# Patient Record
Sex: FEMALE | Race: OTHER | HISPANIC OR LATINO | URBAN - METROPOLITAN AREA
[De-identification: names, ages, dates, MRNs, and addresses within clinical notes are randomized per-mention and may not be internally consistent; named-entity substitution may affect disease eponyms.]

---

## 2023-06-16 ENCOUNTER — HOSPITAL ENCOUNTER (INPATIENT)
Facility: HOSPITAL | Age: 63
LOS: 4 days | Discharge: HOME | DRG: 418 | End: 2023-06-20
Attending: EMERGENCY MEDICINE | Admitting: INTERNAL MEDICINE

## 2023-06-16 DIAGNOSIS — E11.65 HYPERGLYCEMIA DUE TO DIABETES MELLITUS: Primary | ICD-10-CM

## 2023-06-16 DIAGNOSIS — K85.90 ACUTE PANCREATITIS, UNSPECIFIED COMPLICATION STATUS, UNSPECIFIED PANCREATITIS TYPE: ICD-10-CM

## 2023-06-16 DIAGNOSIS — R07.9 CHEST PAIN: ICD-10-CM

## 2023-06-16 LAB
ALBUMIN SERPL-MCNC: 4.1 G/DL (ref 3.5–5)
ALLENS TEST BLOOD GAS (OHS): ABNORMAL
ALP SERPL-CCNC: 224 UNIT/L (ref 40–150)
ALT SERPL-CCNC: 145 UNIT/L (ref 0–55)
ANION GAP SERPL CALC-SCNC: 13 MEQ/L
APPEARANCE UR: CLEAR
AST SERPL-CCNC: 152 UNIT/L (ref 5–34)
B-OH-BUTYR SERPL-MCNC: 0.13 MMOL/L
BACTERIA #/AREA URNS AUTO: ABNORMAL /HPF
BASE EXCESS BLD CALC-SCNC: 4 MMOL/L
BASOPHILS # BLD AUTO: 0.01 X10(3)/MCL
BASOPHILS NFR BLD AUTO: 0.2 %
BILIRUB UR QL STRIP.AUTO: NEGATIVE MG/DL
BILIRUBIN DIRECT+TOT PNL SERPL-MCNC: 0.4 MG/DL (ref 0–0.8)
BILIRUBIN DIRECT+TOT PNL SERPL-MCNC: 0.4 MG/DL (ref 0–?)
BILIRUBIN DIRECT+TOT PNL SERPL-MCNC: 0.8 MG/DL
BLOOD GAS SAMPLE TYPE (OHS): ABNORMAL
BNP BLD-MCNC: 13 PG/ML
BUN SERPL-MCNC: 13.7 MG/DL (ref 9.8–20.1)
CALCIUM SERPL-MCNC: 9.9 MG/DL (ref 8.4–10.2)
CHLORIDE SERPL-SCNC: 100 MMOL/L (ref 98–107)
CHOLEST SERPL-MCNC: 197 MG/DL
CHOLEST/HDLC SERPL: 4 {RATIO} (ref 0–5)
CO2 BLDA-SCNC: 31.2 MMOL/L
CO2 SERPL-SCNC: 24 MMOL/L (ref 22–29)
COHGB MFR BLDA: 2.8 %
COLOR UR: COLORLESS
CREAT SERPL-MCNC: 0.9 MG/DL (ref 0.55–1.02)
CREAT/UREA NIT SERPL: 15
D DIMER PPP IA.FEU-MCNC: 0.48 UG/ML FEU (ref 0–0.5)
EOSINOPHIL # BLD AUTO: 0.09 X10(3)/MCL (ref 0–0.9)
EOSINOPHIL NFR BLD AUTO: 1.4 %
ERYTHROCYTE [DISTWIDTH] IN BLOOD BY AUTOMATED COUNT: 12.6 % (ref 11.5–17)
EST. AVERAGE GLUCOSE BLD GHB EST-MCNC: 286.2 MG/DL
ETHANOL SERPL-MCNC: <10 MG/DL
FERRITIN SERPL-MCNC: 578.85 NG/ML (ref 4.63–204)
FIO2 BLOOD GAS (OHS): 21 %
GFR SERPLBLD CREATININE-BSD FMLA CKD-EPI: >60 MLS/MIN/1.73/M2
GLUCOSE SERPL-MCNC: 202 MG/DL (ref 70–110)
GLUCOSE SERPL-MCNC: 348 MG/DL (ref 74–100)
GLUCOSE UR QL STRIP.AUTO: ABNORMAL MG/DL
HAV IGM SERPL QL IA: NONREACTIVE
HBA1C MFR BLD: 11.6 %
HBV CORE IGM SERPL QL IA: NONREACTIVE
HBV SURFACE AG SERPL QL IA: NONREACTIVE
HCO3 BLDA-SCNC: 29.7 MMOL/L
HCT VFR BLD AUTO: 38.8 % (ref 37–47)
HCV AB SERPL QL IA: NONREACTIVE
HDLC SERPL-MCNC: 50 MG/DL (ref 35–60)
HGB BLD-MCNC: 13.2 G/DL (ref 12–16)
HIV 1+2 AB+HIV1 P24 AG SERPL QL IA: NONREACTIVE
HYALINE CASTS #/AREA URNS LPF: ABNORMAL /LPF
IMM GRANULOCYTES # BLD AUTO: 0.05 X10(3)/MCL (ref 0–0.04)
IMM GRANULOCYTES NFR BLD AUTO: 0.8 %
IRON SATN MFR SERPL: 36 % (ref 20–50)
IRON SERPL-MCNC: 80 UG/DL (ref 50–170)
KETONES UR QL STRIP.AUTO: NEGATIVE MG/DL
LDLC SERPL CALC-MCNC: 102 MG/DL (ref 50–140)
LEUKOCYTE ESTERASE UR QL STRIP.AUTO: 25 UNIT/L
LIPASE SERPL-CCNC: >3000 U/L
LYMPHOCYTES # BLD AUTO: 2.43 X10(3)/MCL (ref 0.6–4.6)
LYMPHOCYTES NFR BLD AUTO: 37.3 %
MAGNESIUM SERPL-MCNC: 1.6 MG/DL (ref 1.6–2.6)
MCH RBC QN AUTO: 29.8 PG (ref 27–31)
MCHC RBC AUTO-ENTMCNC: 34 G/DL (ref 33–36)
MCV RBC AUTO: 87.6 FL (ref 80–94)
METHGB MFR BLDA: 0.8 %
MONOCYTES # BLD AUTO: 0.4 X10(3)/MCL (ref 0.1–1.3)
MONOCYTES NFR BLD AUTO: 6.1 %
NEUTROPHILS # BLD AUTO: 3.54 X10(3)/MCL (ref 2.1–9.2)
NEUTROPHILS NFR BLD AUTO: 54.2 %
NITRITE UR QL STRIP.AUTO: NEGATIVE
NRBC BLD AUTO-RTO: 0 %
O2 HB BLOOD GAS (OHS): 82.6 %
OXYGEN DEVICE BLOOD GAS (OHS): ABNORMAL
OXYHGB MFR BLDA: 12.5 G/DL
PCO2 BLDA: 48 MMHG (ref 40–50)
PH BLDA: 7.4 [PH] (ref 7.3–7.4)
PH UR STRIP.AUTO: 6.5 [PH]
PHOSPHATE SERPL-MCNC: 3.1 MG/DL (ref 2.3–4.7)
PLATELET # BLD AUTO: 204 X10(3)/MCL (ref 130–400)
PMV BLD AUTO: 10.1 FL (ref 7.4–10.4)
PO2 BLDA: 51 MMHG (ref 30–40)
POCT GLUCOSE: 185 MG/DL (ref 70–110)
POCT GLUCOSE: 202 MG/DL (ref 70–110)
POCT GLUCOSE: 210 MG/DL (ref 70–110)
POCT GLUCOSE: 258 MG/DL (ref 70–110)
POCT GLUCOSE: 326 MG/DL (ref 70–110)
POTASSIUM SERPL-SCNC: 3.2 MMOL/L (ref 3.5–5.1)
PROT SERPL-MCNC: 7.4 GM/DL (ref 6.4–8.3)
PROT UR QL STRIP.AUTO: NEGATIVE MG/DL
RBC # BLD AUTO: 4.43 X10(6)/MCL (ref 4.2–5.4)
RBC #/AREA URNS AUTO: ABNORMAL /HPF
RBC UR QL AUTO: NEGATIVE UNIT/L
SAMPLE SITE BLOOD GAS (OHS): ABNORMAL
SAO2 % BLDA: 85.8 %
SODIUM SERPL-SCNC: 137 MMOL/L (ref 136–145)
SP GR UR STRIP.AUTO: 1
SQUAMOUS #/AREA URNS LPF: ABNORMAL /HPF
T PALLIDUM AB SER QL: REACTIVE
TIBC SERPL-MCNC: 142 UG/DL (ref 70–310)
TIBC SERPL-MCNC: 222 UG/DL (ref 250–450)
TRANSFERRIN SERPL-MCNC: 187 MG/DL (ref 180–382)
TRIGL SERPL-MCNC: 223 MG/DL (ref 37–140)
TROPONIN I SERPL-MCNC: 0 NG/ML (ref 0–0.04)
TROPONIN I SERPL-MCNC: <0.01 NG/ML (ref 0–0.04)
TSH SERPL-ACNC: 4.66 UIU/ML (ref 0.35–4.94)
UROBILINOGEN UR STRIP-ACNC: NORMAL MG/DL
VLDLC SERPL CALC-MCNC: 45 MG/DL
WBC # SPEC AUTO: 6.52 X10(3)/MCL (ref 4.5–11.5)
WBC #/AREA URNS AUTO: ABNORMAL /HPF

## 2023-06-16 PROCEDURE — 80061 LIPID PANEL: CPT

## 2023-06-16 PROCEDURE — G0378 HOSPITAL OBSERVATION PER HR: HCPCS

## 2023-06-16 PROCEDURE — 93005 ELECTROCARDIOGRAM TRACING: CPT

## 2023-06-16 PROCEDURE — 96372 THER/PROPH/DIAG INJ SC/IM: CPT | Mod: 59 | Performed by: EMERGENCY MEDICINE

## 2023-06-16 PROCEDURE — 86780 TREPONEMA PALLIDUM: CPT

## 2023-06-16 PROCEDURE — 63600175 PHARM REV CODE 636 W HCPCS: Performed by: STUDENT IN AN ORGANIZED HEALTH CARE EDUCATION/TRAINING PROGRAM

## 2023-06-16 PROCEDURE — 80048 BASIC METABOLIC PNL TOTAL CA: CPT | Performed by: EMERGENCY MEDICINE

## 2023-06-16 PROCEDURE — 96375 TX/PRO/DX INJ NEW DRUG ADDON: CPT

## 2023-06-16 PROCEDURE — 84484 ASSAY OF TROPONIN QUANT: CPT | Mod: 91 | Performed by: EMERGENCY MEDICINE

## 2023-06-16 PROCEDURE — 96361 HYDRATE IV INFUSION ADD-ON: CPT

## 2023-06-16 PROCEDURE — 87389 HIV-1 AG W/HIV-1&-2 AB AG IA: CPT

## 2023-06-16 PROCEDURE — 25000003 PHARM REV CODE 250: Performed by: STUDENT IN AN ORGANIZED HEALTH CARE EDUCATION/TRAINING PROGRAM

## 2023-06-16 PROCEDURE — 63600175 PHARM REV CODE 636 W HCPCS

## 2023-06-16 PROCEDURE — 82728 ASSAY OF FERRITIN: CPT

## 2023-06-16 PROCEDURE — 96376 TX/PRO/DX INJ SAME DRUG ADON: CPT

## 2023-06-16 PROCEDURE — 82077 ASSAY SPEC XCP UR&BREATH IA: CPT | Performed by: EMERGENCY MEDICINE

## 2023-06-16 PROCEDURE — 85379 FIBRIN DEGRADATION QUANT: CPT | Performed by: EMERGENCY MEDICINE

## 2023-06-16 PROCEDURE — 80076 HEPATIC FUNCTION PANEL: CPT | Performed by: STUDENT IN AN ORGANIZED HEALTH CARE EDUCATION/TRAINING PROGRAM

## 2023-06-16 PROCEDURE — 83036 HEMOGLOBIN GLYCOSYLATED A1C: CPT

## 2023-06-16 PROCEDURE — 82010 KETONE BODYS QUAN: CPT | Performed by: EMERGENCY MEDICINE

## 2023-06-16 PROCEDURE — 25000003 PHARM REV CODE 250: Performed by: EMERGENCY MEDICINE

## 2023-06-16 PROCEDURE — 25000242 PHARM REV CODE 250 ALT 637 W/ HCPCS: Performed by: EMERGENCY MEDICINE

## 2023-06-16 PROCEDURE — 82803 BLOOD GASES ANY COMBINATION: CPT

## 2023-06-16 PROCEDURE — 97161 PT EVAL LOW COMPLEX 20 MIN: CPT

## 2023-06-16 PROCEDURE — 81001 URINALYSIS AUTO W/SCOPE: CPT | Performed by: EMERGENCY MEDICINE

## 2023-06-16 PROCEDURE — 96372 THER/PROPH/DIAG INJ SC/IM: CPT

## 2023-06-16 PROCEDURE — 99900035 HC TECH TIME PER 15 MIN (STAT)

## 2023-06-16 PROCEDURE — 84443 ASSAY THYROID STIM HORMONE: CPT

## 2023-06-16 PROCEDURE — 63600175 PHARM REV CODE 636 W HCPCS: Performed by: EMERGENCY MEDICINE

## 2023-06-16 PROCEDURE — 21400001 HC TELEMETRY ROOM

## 2023-06-16 PROCEDURE — 80074 ACUTE HEPATITIS PANEL: CPT

## 2023-06-16 PROCEDURE — 85025 COMPLETE CBC W/AUTO DIFF WBC: CPT | Performed by: EMERGENCY MEDICINE

## 2023-06-16 PROCEDURE — 83735 ASSAY OF MAGNESIUM: CPT | Performed by: EMERGENCY MEDICINE

## 2023-06-16 PROCEDURE — 86592 SYPHILIS TEST NON-TREP QUAL: CPT

## 2023-06-16 PROCEDURE — 83550 IRON BINDING TEST: CPT

## 2023-06-16 PROCEDURE — 83690 ASSAY OF LIPASE: CPT | Performed by: STUDENT IN AN ORGANIZED HEALTH CARE EDUCATION/TRAINING PROGRAM

## 2023-06-16 PROCEDURE — 85610 PROTHROMBIN TIME: CPT

## 2023-06-16 PROCEDURE — 99291 CRITICAL CARE FIRST HOUR: CPT | Mod: 25

## 2023-06-16 PROCEDURE — 25500020 PHARM REV CODE 255

## 2023-06-16 PROCEDURE — 84100 ASSAY OF PHOSPHORUS: CPT

## 2023-06-16 PROCEDURE — 83880 ASSAY OF NATRIURETIC PEPTIDE: CPT | Performed by: EMERGENCY MEDICINE

## 2023-06-16 PROCEDURE — 96374 THER/PROPH/DIAG INJ IV PUSH: CPT

## 2023-06-16 RX ORDER — LABETALOL HCL 20 MG/4 ML
10 SYRINGE (ML) INTRAVENOUS EVERY 4 HOURS PRN
Status: DISCONTINUED | OUTPATIENT
Start: 2023-06-16 | End: 2023-06-20 | Stop reason: HOSPADM

## 2023-06-16 RX ORDER — HYDROMORPHONE HYDROCHLORIDE 1 MG/ML
0.5 INJECTION, SOLUTION INTRAMUSCULAR; INTRAVENOUS; SUBCUTANEOUS
Status: COMPLETED | OUTPATIENT
Start: 2023-06-16 | End: 2023-06-16

## 2023-06-16 RX ORDER — MORPHINE SULFATE 2 MG/ML
2 INJECTION, SOLUTION INTRAMUSCULAR; INTRAVENOUS EVERY 4 HOURS PRN
Status: DISCONTINUED | OUTPATIENT
Start: 2023-06-16 | End: 2023-06-20 | Stop reason: HOSPADM

## 2023-06-16 RX ORDER — SODIUM CHLORIDE 0.9 % (FLUSH) 0.9 %
10 SYRINGE (ML) INJECTION EVERY 12 HOURS PRN
Status: DISCONTINUED | OUTPATIENT
Start: 2023-06-16 | End: 2023-06-20 | Stop reason: HOSPADM

## 2023-06-16 RX ORDER — ONDANSETRON 2 MG/ML
4 INJECTION INTRAMUSCULAR; INTRAVENOUS EVERY 8 HOURS PRN
Status: DISCONTINUED | OUTPATIENT
Start: 2023-06-16 | End: 2023-06-20 | Stop reason: HOSPADM

## 2023-06-16 RX ORDER — POTASSIUM CHLORIDE 7.45 MG/ML
10 INJECTION INTRAVENOUS
Status: COMPLETED | OUTPATIENT
Start: 2023-06-16 | End: 2023-06-16

## 2023-06-16 RX ORDER — NITROGLYCERIN 0.4 MG/1
0.4 TABLET SUBLINGUAL EVERY 5 MIN PRN
Status: DISCONTINUED | OUTPATIENT
Start: 2023-06-16 | End: 2023-06-20 | Stop reason: HOSPADM

## 2023-06-16 RX ORDER — DEXTROSE 40 %
16 GEL (GRAM) ORAL
Status: DISCONTINUED | OUTPATIENT
Start: 2023-06-16 | End: 2023-06-20 | Stop reason: HOSPADM

## 2023-06-16 RX ORDER — MORPHINE SULFATE 2 MG/ML
4 INJECTION, SOLUTION INTRAMUSCULAR; INTRAVENOUS
Status: COMPLETED | OUTPATIENT
Start: 2023-06-16 | End: 2023-06-16

## 2023-06-16 RX ORDER — HYDROMORPHONE HYDROCHLORIDE 1 MG/ML
1 INJECTION, SOLUTION INTRAMUSCULAR; INTRAVENOUS; SUBCUTANEOUS EVERY 6 HOURS PRN
Status: DISCONTINUED | OUTPATIENT
Start: 2023-06-16 | End: 2023-06-19

## 2023-06-16 RX ORDER — ASPIRIN 325 MG
325 TABLET ORAL
Status: COMPLETED | OUTPATIENT
Start: 2023-06-16 | End: 2023-06-16

## 2023-06-16 RX ORDER — INSULIN ASPART 100 [IU]/ML
5 INJECTION, SOLUTION INTRAVENOUS; SUBCUTANEOUS
Status: COMPLETED | OUTPATIENT
Start: 2023-06-16 | End: 2023-06-16

## 2023-06-16 RX ORDER — SODIUM CHLORIDE, SODIUM LACTATE, POTASSIUM CHLORIDE, CALCIUM CHLORIDE 600; 310; 30; 20 MG/100ML; MG/100ML; MG/100ML; MG/100ML
INJECTION, SOLUTION INTRAVENOUS CONTINUOUS
Status: DISCONTINUED | OUTPATIENT
Start: 2023-06-16 | End: 2023-06-20

## 2023-06-16 RX ORDER — INSULIN ASPART 100 [IU]/ML
0-5 INJECTION, SOLUTION INTRAVENOUS; SUBCUTANEOUS
Status: DISCONTINUED | OUTPATIENT
Start: 2023-06-16 | End: 2023-06-20 | Stop reason: HOSPADM

## 2023-06-16 RX ORDER — NITROGLYCERIN 0.4 MG/1
TABLET SUBLINGUAL
Status: DISPENSED
Start: 2023-06-16 | End: 2023-06-16

## 2023-06-16 RX ORDER — ENOXAPARIN SODIUM 100 MG/ML
40 INJECTION SUBCUTANEOUS EVERY 24 HOURS
Status: DISCONTINUED | OUTPATIENT
Start: 2023-06-16 | End: 2023-06-20 | Stop reason: HOSPADM

## 2023-06-16 RX ORDER — GLUCAGON 1 MG
1 KIT INJECTION
Status: DISCONTINUED | OUTPATIENT
Start: 2023-06-16 | End: 2023-06-20 | Stop reason: HOSPADM

## 2023-06-16 RX ORDER — HYDRALAZINE HYDROCHLORIDE 20 MG/ML
10 INJECTION INTRAMUSCULAR; INTRAVENOUS EVERY 6 HOURS PRN
Status: DISCONTINUED | OUTPATIENT
Start: 2023-06-16 | End: 2023-06-20 | Stop reason: HOSPADM

## 2023-06-16 RX ORDER — DEXTROSE 40 %
24 GEL (GRAM) ORAL
Status: DISCONTINUED | OUTPATIENT
Start: 2023-06-16 | End: 2023-06-20 | Stop reason: HOSPADM

## 2023-06-16 RX ORDER — MAG HYDROX/ALUMINUM HYD/SIMETH 200-200-20
15 SUSPENSION, ORAL (FINAL DOSE FORM) ORAL
Status: COMPLETED | OUTPATIENT
Start: 2023-06-16 | End: 2023-06-16

## 2023-06-16 RX ORDER — NALOXONE HCL 0.4 MG/ML
0.02 VIAL (ML) INJECTION
Status: DISCONTINUED | OUTPATIENT
Start: 2023-06-16 | End: 2023-06-20 | Stop reason: HOSPADM

## 2023-06-16 RX ADMIN — MORPHINE SULFATE 2 MG: 2 INJECTION, SOLUTION INTRAMUSCULAR; INTRAVENOUS at 07:06

## 2023-06-16 RX ADMIN — POTASSIUM CHLORIDE 10 MEQ: 7.46 INJECTION, SOLUTION INTRAVENOUS at 11:06

## 2023-06-16 RX ADMIN — MORPHINE SULFATE 4 MG: 2 INJECTION, SOLUTION INTRAMUSCULAR; INTRAVENOUS at 04:06

## 2023-06-16 RX ADMIN — POTASSIUM CHLORIDE 10 MEQ: 7.46 INJECTION, SOLUTION INTRAVENOUS at 02:06

## 2023-06-16 RX ADMIN — HYDROMORPHONE HYDROCHLORIDE 1 MG: 1 INJECTION, SOLUTION INTRAMUSCULAR; INTRAVENOUS; SUBCUTANEOUS at 04:06

## 2023-06-16 RX ADMIN — ENOXAPARIN SODIUM 40 MG: 40 INJECTION SUBCUTANEOUS at 04:06

## 2023-06-16 RX ADMIN — ONDANSETRON 4 MG: 2 INJECTION INTRAMUSCULAR; INTRAVENOUS at 10:06

## 2023-06-16 RX ADMIN — HYDROMORPHONE HYDROCHLORIDE 0.5 MG: 1 INJECTION, SOLUTION INTRAMUSCULAR; INTRAVENOUS; SUBCUTANEOUS at 07:06

## 2023-06-16 RX ADMIN — POTASSIUM CHLORIDE 10 MEQ: 7.46 INJECTION, SOLUTION INTRAVENOUS at 12:06

## 2023-06-16 RX ADMIN — ALUMINUM HYDROXIDE, MAGNESIUM HYDROXIDE, AND SIMETHICONE 15 ML: 200; 200; 20 SUSPENSION ORAL at 03:06

## 2023-06-16 RX ADMIN — HYDROMORPHONE HYDROCHLORIDE 1 MG: 1 INJECTION, SOLUTION INTRAMUSCULAR; INTRAVENOUS; SUBCUTANEOUS at 10:06

## 2023-06-16 RX ADMIN — INSULIN ASPART 5 UNITS: 100 INJECTION, SOLUTION INTRAVENOUS; SUBCUTANEOUS at 04:06

## 2023-06-16 RX ADMIN — ASPIRIN 325 MG ORAL TABLET 325 MG: 325 PILL ORAL at 03:06

## 2023-06-16 RX ADMIN — POTASSIUM CHLORIDE 10 MEQ: 7.46 INJECTION, SOLUTION INTRAVENOUS at 01:06

## 2023-06-16 RX ADMIN — NITROGLYCERIN 0.4 MG: 0.4 TABLET SUBLINGUAL at 03:06

## 2023-06-16 RX ADMIN — SODIUM CHLORIDE 1000 ML: 9 INJECTION, SOLUTION INTRAVENOUS at 07:06

## 2023-06-16 RX ADMIN — IOHEXOL 100 ML: 350 INJECTION, SOLUTION INTRAVENOUS at 06:06

## 2023-06-16 RX ADMIN — SODIUM CHLORIDE 1000 ML: 9 INJECTION, SOLUTION INTRAVENOUS at 09:06

## 2023-06-16 RX ADMIN — SODIUM CHLORIDE, POTASSIUM CHLORIDE, SODIUM LACTATE AND CALCIUM CHLORIDE: 600; 310; 30; 20 INJECTION, SOLUTION INTRAVENOUS at 11:06

## 2023-06-16 RX ADMIN — MORPHINE SULFATE 2 MG: 2 INJECTION, SOLUTION INTRAMUSCULAR; INTRAVENOUS at 02:06

## 2023-06-16 RX ADMIN — SODIUM CHLORIDE, POTASSIUM CHLORIDE, SODIUM LACTATE AND CALCIUM CHLORIDE: 600; 310; 30; 20 INJECTION, SOLUTION INTRAVENOUS at 06:06

## 2023-06-16 RX ADMIN — INSULIN ASPART 3 UNITS: 100 INJECTION, SOLUTION INTRAVENOUS; SUBCUTANEOUS at 12:06

## 2023-06-16 RX ADMIN — MORPHINE SULFATE 4 MG: 2 INJECTION, SOLUTION INTRAMUSCULAR; INTRAVENOUS at 02:06

## 2023-06-16 RX ADMIN — INSULIN ASPART 2 UNITS: 100 INJECTION, SOLUTION INTRAVENOUS; SUBCUTANEOUS at 04:06

## 2023-06-16 NOTE — PT/OT/SLP EVAL
Physical Therapy Evaluation    Patient Name:  Mary Gordillo   MRN:  [id number]    Recommendations:     Discharge Recommendations:  home with home health   Discharge Equipment Recommendations: other (see comments) (To be determined based on performance in future sessions)   Barriers to discharge: severity of deficits, limited family support, time since onset, and medical diagnosis    Assessment:     Mary Gordillo is a 62 y.o. female admitted with a medical diagnosis of hyperglycemia, chest pain, acute pancreatitis.  She presents with the following impairments/functional limitations:  weakness, gait instability, impaired balance, decreased lower extremity function, decreased safety awareness, pain.    Rehab Prognosis: Good.    Patient would benefit from continued skilled acute PT services to: address above listed impairments/functional limitations; receive patient/caregiver education; reduce fall risk; and maximize independency/safety with functional mobility.    Recent Surgery: * No surgery found *      Plan:     During this hospitalization, patient to be seen 3 x/week to address the identified impairments/functional limitations via gait training, therapeutic activities, therapeutic exercises, neuromuscular re-education and progress toward the established goals.    Plan of Care Expires:       Subjective     Communicated with patient's nurse Shannan prior to session.    Patient agreeable to participate in evaluation.     Chief Complaint: Abdominal pain  Patient/Family Comments/goals: None stated  Pain/Comfort:  Pain Rating 1: 8/10  Location - Orientation 1: upper  Location 1: abdomen  Pain Addressed 1: Nurse notified  Pain Rating Post-Intervention 1: 8/10  Location - Orientation 2: upper  Location 2: abdomen  Pain Addressed 2: Nurse notified    Patients cultural, spiritual, Mandaeism conflicts given the current situation: yes    Social History  Living Environment: Patient lives alone in a mobile home, with  4 steps steps outside  Functional Level: Prior to admission patient ambulated without assistive device, was independent in ADL's, and was independent in IADL's.  Equipment at Home :none  Assistance Upon Discharge: unknown.    Objective:     Patient found supine in bed with telemetry, peripheral IV  upon PT entry to room.    General Precautions: Standard, other (see comments) ( needed: [place])   Orthopedic Precautions:    Braces:    Respiratory Status: room air    Vitals     With Activity (post-session)  BP  123/78   HR  98   O2 Sat %  99     Exams:  Orientation: Patient is oriented to Person, Place, Time, Situation  Commands: Patient follows commands inconsistently  BILAT UE ROM/strength - defer to OT - see OT note for details  RLE ROM: WNL  RLE Strength: Deficits: 3+/5 quad and hip flexor  LLE ROM: WNL  LLE Strength: Deficits: 3+/5 quad and hip flexor    Functional Mobility:    Bed Mobility:  Rolling Right: minimum assistance    Transfers:  Sit to Stand: minimum assistance with no assistive device    Gait:  Patient ambulated 30ft with no assistive device and contact guard assistance.  Patient demonstrates occasional unsteady gait, decreased kathrin, and wide base of support.    Other Mobility:  not assessed    Balance:  Sit  Static: FAIR-: Maintains without assist but inconsistent   Dynamic: FAIR+: Maintains balance through MINIMAL excursions of active trunk motion  Stand  Static: FAIR: Maintains without assist but unable to take challenges  Dynamic: FAIR: Needs CONTACT GUARD during gait    Patient left with HOB elevated with all lines intact.    Pt became nauseated and dizzy with ambulation.  RN noted that pt given emesis bag.      Education     Patient was instructed to utilize staff assistance for mobility/transfers.  White board updated regarding patient's safest level of mobility with staff assistance.    Goals     Multidisciplinary Problems       Physical Therapy Goals          Problem:  Physical Therapy    Goal Priority Disciplines Outcome Goal Variances Interventions   Physical Therapy Goal     PT, PT/OT Ongoing, Progressing     Description: Goals to be met by: Discharge     Patient will increase functional independence with mobility by performin. Supine to sit with Modified [place]  2. Sit to supine with Modified [place]  3. Sit to stand transfer with Modified [place]  4. Gait  x 300 feet with Modified [place] using No Assistive Device.                        History:     Past Medical History:   Diagnosis Date    Essential (primary) hypertension     Type 2 diabetes mellitus with unspecified diabetic retinopathy without macular edema      History reviewed. No pertinent surgical history.  Time Tracking:     PT Received On: 23  PT Start Time: 1456     PT Stop Time: 1532  PT Total Time (min): 38 misn    Billable Minutes: Evaluation LOW 38 mins    2023

## 2023-06-16 NOTE — PLAN OF CARE
Problem: Physical Therapy  Goal: Physical Therapy Goal  Description: Goals to be met by: Discharge     Patient will increase functional independence with mobility by performin. Supine to sit with Modified [place]  2. Sit to supine with Modified [place]  3. Sit to stand transfer with Modified [place]  4. Gait  x 300 feet with Modified [place] using No Assistive Device.     Outcome: Ongoing, Progressing

## 2023-06-16 NOTE — ED PROVIDER NOTES
ED PROVIDER NOTE  6/16/2023    CHIEF COMPLAINT:   Chief Complaint   Patient presents with    Hyperglycemia     Pt c/o chest pain and hyperglycemia, symptoms began approx 30 min prior to arrival. Pt took cbg at home and reports it read >500. Pt received 25 units of her insulin and drank water and pt's cbg 326 @ 0231 in triage.        HISTORY OF PRESENT ILLNESS:   Mary Gordillo is a 62 y.o. female who presents with chief complaint Chest pain.  Onset was about an hour prior to arrival whenever she began having epigastric and substernal chest pain characterized as pressure that has been constant, severe, aggravated with movement, no alleviating factors noted.  Associated symptoms of shortness of breath, lightheadedness, and nausea.  She reports having similar episode about a week ago and her blood sugar was really high so she took some insulin and walked around and got better.  Her blood sugar was high today so she took some insulin and tried walking around but this made it worse.  She reports history of hypertension, diabetes, and had kidney problems in the past.  Denies history of MI or PCI.    The history is provided by the patient and a friend. The history is limited by a language barrier and the condition of the patient. A  was used.       REVIEW OF SYSTEMS: as noted in the HPI.  NURSING NOTES REVIEWED      PAST MEDICAL/SURGICAL HISTORY:   Past Medical History:   Diagnosis Date    Essential (primary) hypertension     Type 2 diabetes mellitus with unspecified diabetic retinopathy without macular edema     History reviewed. No pertinent surgical history.    FAMILY HISTORY: History reviewed. No pertinent family history.    SOCIAL HISTORY:   Social History     Tobacco Use    Smoking status: Never    Smokeless tobacco: Never   Substance Use Topics    Drug use: Never       ALLERGIES: Review of patient's allergies indicates:  No Known Allergies    PHYSICAL EXAM:  Initial Vitals [06/16/23 0242]    BP Pulse Resp Temp SpO2   (!) 195/104 80 (!) 24 97.7 °F (36.5 °C) 98 %      MAP       --         Physical Exam    Nursing note and vitals reviewed.  Constitutional: She appears well-developed and well-nourished. She appears distressed.   HENT:   Head: Normocephalic and atraumatic.   Mouth/Throat: Uvula is midline and mucous membranes are normal.   Eyes: EOM are normal. Pupils are equal, round, and reactive to light.   Neck: Trachea normal. Neck supple.   Cardiovascular:  Normal rate, regular rhythm, intact distal pulses and normal pulses.           Pulmonary/Chest: Effort normal and breath sounds normal.   Abdominal: Abdomen is soft. Bowel sounds are normal. There is abdominal tenderness in the right upper quadrant and epigastric area. There is no rebound and no guarding.   Musculoskeletal:         General: Normal range of motion.      Cervical back: Neck supple.     Neurological: She is alert and oriented to person, place, and time. GCS eye subscore is 4. GCS verbal subscore is 5. GCS motor subscore is 6.   Skin: Skin is warm and dry.   Psychiatric: She has a normal mood and affect. Her speech is normal. Thought content normal.       RESULTS:  Labs Reviewed   BASIC METABOLIC PANEL - Abnormal; Notable for the following components:       Result Value    Potassium Level 3.2 (*)     Glucose Level 348 (*)     All other components within normal limits   URINALYSIS, REFLEX TO URINE CULTURE - Abnormal; Notable for the following components:    Color, UA Colorless (*)     Glucose, UA 4+ (*)     Leukocyte Esterase, UA 25 (*)     Squamous Epithelial Cells, UA Trace (*)     All other components within normal limits   BLOOD GAS - Abnormal; Notable for the following components:    pO2, Blood gas 51.0 (*)     All other components within normal limits   CBC WITH DIFFERENTIAL - Abnormal; Notable for the following components:    IG# 0.05 (*)     All other components within normal limits   LIPASE - Abnormal; Notable for the  following components:    Lipase Level >3,000 (*)     All other components within normal limits   HEPATIC FUNCTION PANEL - Abnormal; Notable for the following components:    Alkaline Phosphatase 224 (*)     Alanine Aminotransferase 145 (*)     Aspartate Aminotransferase 152 (*)     All other components within normal limits   POCT GLUCOSE - Abnormal; Notable for the following components:    POCT Glucose 326 (*)     All other components within normal limits   POCT GLUCOSE MONITORING CONTINUOUS - Abnormal; Notable for the following components:    POC Glucose 202 (*)     All other components within normal limits   POCT GLUCOSE - Abnormal; Notable for the following components:    POCT Glucose 202 (*)     All other components within normal limits   B-TYPE NATRIURETIC PEPTIDE - Normal   MAGNESIUM - Normal   TROPONIN I - Normal   BETA - HYDROXYBUTYRATE, SERUM - Normal   D DIMER, QUANTITATIVE - Normal   TROPONIN I - Normal   ALCOHOL,MEDICAL (ETHANOL) - Normal   CBC W/ AUTO DIFFERENTIAL    Narrative:     The following orders were created for panel order CBC auto differential.  Procedure                               Abnormality         Status                     ---------                               -----------         ------                     CBC with Differential[[id number]]        Abnormal            Final result                 Please view results for these tests on the individual orders.   EXTRA TUBES    Narrative:     The following orders were created for panel order EXTRA TUBES.  Procedure                               Abnormality         Status                     ---------                               -----------         ------                     Light Blue Top Hold[[id number]]                                                         Red Top Hold[[id number]]                                                                  Please view results for these tests on the individual orders.   LIGHT BLUE TOP HOLD   RED TOP  HOLD   EXTRA TUBES    Narrative:     The following orders were created for panel order EXTRA TUBES.  Procedure                               Abnormality         Status                     ---------                               -----------         ------                     Light Blue Top Hold[[id number]]                              In process                 Red Top Hold[[id number]]                                     In process                 Lavender Top Hold[[id number]]                                In process                 Gold Top Hold[[id number]]                                    In process                   Please view results for these tests on the individual orders.   LIGHT BLUE TOP HOLD   RED TOP HOLD   LAVENDER TOP HOLD   GOLD TOP HOLD   PATHOLOGIST INTERPRETATION     Imaging Results              US Abdomen Limited (In process)                      CT Abdomen Pelvis With Contrast (Final result)  Result time 06/16/23 07:30:59      Final result by Ryanne George MD (06/16/23 07:30:59)                   Impression:      1. Small amount of fluid centered around the pancreas, suggestive of acute pancreatitis.  No drainable fluid collection.  2. Cholelithiasis with distended gallbladder.      Electronically signed by: Ryanne George  Date:    06/16/2023  Time:    07:30               Narrative:    EXAMINATION:  CT ABDOMEN PELVIS WITH CONTRAST    CLINICAL HISTORY:  Epigastric pain;    TECHNIQUE:  CT imaging was performed of the abdomen and pelvis after the administration of intravenous contrast. Dose length product is 311 mGycm. Automatic exposure control, adjustment of mA/kV or iterative reconstruction technique was used to limit radiation dose.    COMPARISON:  None    FINDINGS:  Liver: Unremarkable.    Gallbladder and biliary tree: Large gallstone is noted.  The gallbladder is distended.  No intra or extrahepatic biliary ductal dilation.    Pancreas: The pancreas enhances normally.    Spleen:  Normal.    Adrenals: Normal.    Kidneys and ureters: No hydronephrosis.    Bladder: Normal.    Reproductive organs: No pelvic masses.    Stomach/bowel: No evidence of bowel obstruction. Appendix is normal. No discernible bowel inflammation.    Lymph nodes: No pathologically enlarged lymph node identified.    Peritoneum: There is a small amount of fluid surrounding the pancreas, duodenum and central mesentery    Vessels: No abdominal aortic aneurysm.    Abdominal wall: Normal.    Lung bases: No consolidation or pleural effusion.    Bones: No acute osseous findings.                                       X-Ray Chest AP Portable (Final result)  Result time 06/16/23 06:26:55      Final result by Ryanne George MD (06/16/23 06:26:55)                   Impression:      No acute abnormality of the chest.      Electronically signed by: Ryanne George  Date:    06/16/2023  Time:    06:26               Narrative:    EXAMINATION:  XR CHEST AP PORTABLE    CLINICAL HISTORY:  chest pain;    COMPARISON:  None    FINDINGS:  The heart is normal in size.  The lungs are clear.  There is no pleural effusion or visible pneumothorax.                        Wet Read by Ender Pelaez DO (06/16/23 03:11:59, [facility], Emergency Medicine)    Normal cardiomediastinal silhouette.  No dense lobar consolidation or pneumothorax.                                    PROCEDURES:  Critical Care    Date/Time: 6/16/2023 9:06 AM  Performed by: Willian Ma MD  Authorized by: Willian Ma MD   Total critical care time (exclusive of procedural time) : 30 minutes  Critical care time was exclusive of separately billable procedures and treating other patients.  Critical care was time spent personally by me on the following activities: evaluation of patient's response to treatment, obtaining history from patient or surrogate, ordering and review of laboratory studies, pulse oximetry, review of old charts, development of  treatment plan with patient or surrogate, examination of patient, ordering and performing treatments and interventions, ordering and review of radiographic studies and re-evaluation of patient's condition.        ECG:  EKG Readings: (Independently Interpreted)   Initial Reading: No STEMI. Rhythm: Normal Sinus Rhythm. Heart Rate: 94. Conduction: LBBB. Axis: Normal.     ED COURSE AND MEDICAL DECISION MAKING:  Medications   nitroGLYCERIN SL tablet 0.4 mg (0.4 mg Sublingual Given 6/16/23 0301)   sodium chloride 0.9% bolus 1,000 mL 1,000 mL (1,000 mLs Intravenous New Bag 6/16/23 0902)   aspirin tablet 325 mg (325 mg Oral Given 6/16/23 0300)   morphine injection 4 mg (4 mg Intravenous Given 6/16/23 0259)   aluminum-magnesium hydroxide-simethicone 200-200-20 mg/5 mL suspension 15 mL (15 mLs Oral Given 6/16/23 0334)   morphine injection 4 mg (4 mg Intravenous Given 6/16/23 0446)   insulin aspart U-100 injection 5 Units (5 Units Subcutaneous Given 6/16/23 0446)   iohexoL (OMNIPAQUE 350) 350 mg iodine/mL injection (100 mLs Intravenous Given 6/16/23 0630)   sodium chloride 0.9% bolus 1,000 mL 1,000 mL (0 mLs Intravenous Stopped 6/16/23 0847)   HYDROmorphone injection 0.5 mg (0.5 mg Intravenous Given 6/16/23 0747)     ED Course as of 06/16/23 0905 Fri Jun 16, 2023   0321 Reports chest pain improving, now just having epigastric pain. [IB]   0609 She reports having some improvement in her abdominal pain with Maalox but not completely resolved. [IB]      ED Course User Index  [IB] Ender Pelaez DO     Additional MDM:   Heart Score:    History:          Slightly suspicious.  ECG:             Nonspecific repolarisation disturbance  Age:               45-65 years  Risk factors: 1-2 risk factors  Troponin:       Less than or equal to normal limit  Final Score: 3    Medical Decision Making  52-year-old female who presents with sudden-onset epigastric and substernal chest pain associated with shortness of breath, lightheadedness,  "nausea.  Chest pain workup initiated.  ECG shows normal sinus rhythm with left bundle-branch block, does not meet Sgarbossa criteria for STEMI.  She was given nitroglycerin with improvement in her blood pressure along with morphine and Maalox with improvement in her epigastric and chest pain.  Initial and repeat troponin normal.  D-dimer negative.  BNP normal.  CBC shows no leukocytosis or leukopenia or anemia.  BMP shows glucose 348, potassium 3.2, normal bicarb, normal BUN and creatinine.  Given 5 units of insulin SQ with improvement in her blood sugar.  Chest x-ray shows no acute intrathoracic process.  She began complaining of recurring pain in her upper abdomen mostly in the epigastric region.  After further discussion with the patient regarding her symptoms, she reports that she had similar symptoms a couple of months ago and was seen in [place] and diagnosed with "a bacteria" in her stomach which I suspect was H pylori as she was started on 3 medications and states that she felt better and never had testing to confirm eradication of H pylori, and states that she did not continue taking any medication for her stomach such as a proton pump inhibitor.  She states she also had stopped taking her losartan 100 mg daily about a month ago as well.  Given her persistence of abdominal pain we will get CT scan to evaluate for any acute pathology.  If CT is unremarkable then would start her on proton pump inhibitor and also started back on her blood pressure medication.  She states that she has enough insulin to cover her for the 3 months that she will be in the [facility]. Care transferred to Dr. Ma.      Patient was signed out to me upon conclusion of Dr. Pelaez's shift.  Remained intermittently uncomfortable so stronger pain medicine administered, Dilaudid.  Afterwards patient was more comfortable.  Vitals remained stable.  CT scan returned suggesting acute pancreatitis and gallstones.  LFTs and lipase " ordered.  Lipase returned after significant delay greater than 3,000.  Provided multiple boluses of fluid which again improved symptoms.  Minimal elevation of AST ALT.  No bilirubin elevation.  Formal ultrasound report did not indicate evidence of acute cholecystitis.  Medicine consulted and accepting of patient.  Significant time was spent in isolation managing this patient's care. (Anil)    Problems Addressed:  Chest pain: complicated acute illness or injury     Details: Differential Diagnoses: Acute Coronary Syndrome, Aortic Dissection, Pneumonia, Pneumothorax, Pulmonary Embolism, Neoplasm, Esophagitis, Boerhaave's, Gastroesophageal Reflux, Costochondritis, Rib Fracture, Musculoskeletal Pain.    Amount and/or Complexity of Data Reviewed  Labs: ordered. Decision-making details documented in ED Course.  Radiology: ordered and independent interpretation performed. Decision-making details documented in ED Course.  ECG/medicine tests: ordered and independent interpretation performed. Decision-making details documented in ED Course.    Risk  Prescription drug management.  Decision regarding hospitalization.  Diagnosis or treatment significantly limited by social determinants of health.        CLINICAL IMPRESSION:  1. Hyperglycemia due to diabetes mellitus    2. Chest pain    3. Acute pancreatitis, unspecified complication status, unspecified pancreatitis type        DISPOSITION:   ED Disposition Condition    Observation Lenore Ma MD  06/16/23 0921

## 2023-06-16 NOTE — PROGRESS NOTES
"Inpatient Nutrition Evaluation    Admit Date: 2023   Total duration of encounter: 1 day    Nutrition Recommendation/Prescription     When appropriate--ADAT to diabetic /low fat diet  Pt education complete on diet/ [place] interperter system used for ed  MVI/fe  Biweekly wt  Will monitor nutrition status     Nutrition Assessment     Chart Review    Reason Seen: continuous nutrition monitoring    Malnutrition Screening Tool Results   Have you recently lost weight without trying?: No  Have you been eating poorly because of a decreased appetite?: No   MST Score: 0     Diagnosis:  Hyperglycemia 2 DM, CP, acute pancreatitis     Relevant Medical History: DM/HTN     Nutrition-Related Medications: IVF @ 125ml/hr; Kcl     Nutrition-Related Labs:  () H/H 13.2/38.8 Gluc 348(H) Bun 13.7 Cr 0.9 Alk Phos 224(H) K 3.2(L) (H) (H) Lipase >3000     Diet Order: Diet NPO  Oral Supplement Order: none  Appetite/Oral Intake: NPO/NPO  Factors Affecting Nutritional Intake: nausea, NPO, and vomiting  Food/Church/Cultural Preferences: none reported  Food Allergies: none reported       Wound(s):   none    Comments    () Used  system for pt assessment; daughter at bedside; pt currently NPO; pt reported N/V x 1 days; usually eats well; on regular diet at home; eats pork tamales frequently; no wt loss reported. Complained for shoulder pain; reported to RN. Discussed diabetic diet; Malagasy handout provided.     Anthropometrics    Height: 5' 5" (165.1 cm)    Last Weight: 77.7 kg (171 lb 4.8 oz) (23 0242) Weight Method: Bed Scale  BMI (Calculated): 28.5  BMI Classification: overweight (BMI 25-29.9)     Ideal Body Weight (IBW), Female: 125 lb     % Ideal Body Weight, Female (lb): 137.04 %                    Usual Body Weight (UBW), k.7 kg  % Usual Body Weight: 100.21     Usual Weight Provided By: patient, family/caregiver, and EMR weight history    Wt Readings from Last 5 Encounters: "   06/16/23 77.7 kg (171 lb 4.8 oz)     Weight Change(s) Since Admission:  Admit Weight: 77.7 kg (171 lb 4.8 oz) (06/16/23 0242)  No wt loss     Patient Education    Education Provided: diabetic diet  Teaching Method: explanation and printed materials  Comprehension: verbalizes understanding  Barriers to Learning: none evident  Expected Compliance: fair  Comments: All questions were answered and dietitian's contact information was provided.     Monitoring & Evaluation     Dietitian will monitor food and beverage intake, parenteral nutrition intake, and food/nutrition knowledge skill.  Nutrition Risk/Follow-Up: low (follow-up in 5-7 days)  Patients assigned 'low nutrition risk' status do not qualify for a full nutritional assessment but will be monitored and re-evaluated in a 5-7 day time period. Please consult if re-evaluation needed sooner.

## 2023-06-16 NOTE — H&P
[facility] Medicine Wards   History & Physical Note     Resident Team: [facility] Medicine List 3  Attending Physician: Claudia Schrader MD  Resident: Rony Mccray   Intern: Dian Burt      Date of Admit: 6/16/2023    Chief Complaint:     Hyperglycemia (Pt c/o chest pain and hyperglycemia, symptoms began approx 30 min prior to arrival. Pt took cbg at home and reports it read >500. Pt received 25 units of her insulin and drank water and pt's cbg 326 @ 0231 in triage. )    Subjective:      History of Present Illness:  Mary Gordillo is a 62 y.o. female with a history of T2DM and HLD who presented to [facility] ED on 6/16/2023  with complaint of diffuse abdominal pain.  Patient states she began to experience sudden onset epigastric pain and substernal chest pain overnight starting around 11pm.  She states she experienced a similar episode of pain approxx 1 week ago which resolved on its own after a few hours after drinking water.  This new episode has been constant over the last several hours and has been associated with 1 episode of N/V and shortness of breath.  Patient states she checked her blood sugar this morning and it was elevated >300.  Patient is on insulin therapy at home and was previously on metformin which was discontinued 2 months ago by PCP.  She denies any tobacco, alcohol, or recreational drug use.  She has no hx of abdominal surgeries but does endorse 1 episode of kidney dysfunction aproxx 1 year ago where she states she had to have a ureteral stent placed.   Patient denies any hematemesis or hematochezia and had her last bowel movement approx 1 day ago.  She denies any F/C, diarrhea, constipation, melena, abdominal distension, back pain, LE swelling, palpitations, or productive cough.  She is endorsing some vaginal bleeding starting a few days ago.  She states her last menstrual cycle was 10 years ago but does endorse an episode of chlamydia approxx 1 year ago.      In the ED, patient was initially found  to be hypertensive with BP of 195/104 and tachypnic at 24.  Other vitals were stable, satting well on room air.  Troponin and BNP performed in setting of chest pain which were both wnl.  EKG showed LBBB but no other major abnormalities noted.  Chest x-ray showed no acute processes.  CT AP performed showing findings concerning for pancreatitis and cholecystitis.  Abdominal US was performed showing gallstones but no acute processes or bile duct dilation.  Lipase was found to be >3000 and blood sugar was elevated at 348 along with AST/ALT at 152/145.  Internal medicine was consulted secondary to acute pancreatitis.      Past Medical History:   has a past medical history of Essential (primary) hypertension and Type 2 diabetes mellitus with unspecified diabetic retinopathy without macular edema.     Past Surgical History:   has no past surgical history on file.     Family History:  family history is not on file.     Social History:   reports that she has never smoked. She has never used smokeless tobacco. She reports that she does not use drugs.     Allergies:  has No Known Allergies.     Home Medications:  Prior to Admission medications    Medication Sig Start Date End Date Taking? Authorizing Provider   insulin NPH-insulin regular, 70/30, 100 unit/mL (70-30) injection Inject 20 Units into the skin 2 (two) times daily.   Yes Historical Provider     ROS is negative unless stated above      Objective:     Vital Signs (Most Recent):  Temp: 97.9 °F (36.6 °C) (06/16/23 1054)  Pulse: 86 (06/16/23 1054)  Resp: 16 (06/16/23 1059)  BP: 123/73 (06/16/23 1054)  SpO2: 98 % (06/16/23 1054) Vital Signs (24h Range):  Temp:  [97.7 °F (36.5 °C)-97.9 °F (36.6 °C)] 97.9 °F (36.6 °C)  Pulse:  [71-86] 86  Resp:  [12-24] 16  SpO2:  [95 %-100 %] 98 %  BP: (113-195)/() 123/73     Physical Examination:  General: Patient resting in bed, in moderate distress secondary to abdominal pain  Eye: PERRLA, EOMI, clear conjunctiva, eyelids  normal  HENT: Head-normocephalic and atraumatic  Neck: full range of motion, no thyromegaly or lymphadenopathy, trachea midline, supple, no palpable thyroid nodules  Respiratory: clear to auscultation bilaterally without wheezes, rales, rhonchi  Cardiovascular: regular rate and rhythm without murmurs.  No gallops or rubs no JVD.  Capillary refill within normal limits.  Gastrointestinal: moderately tender to palpation more notable in the epigastric region with guarding but no rebound tenderness.  Soft, non-distended, hypoactive bowel sounds.    Genitourinary: no CVA tenderness to palpation  Musculoskeletal: full range of motion of all extremities/spine without limitation or discomfort  Integumentary: no rashes or skin lesions present  Neurologic: no signs of peripheral neurological deficit, motor/sensory function intact  Psychiatric:  alert and oriented, cognitive function intact, cooperative with exam, good eye contact, judgement and insight intact, mood and affect full range.     Laboratory:  Most Recent Data:  Recent Lab Results  (Last 5 results in the past 24 hours)        06/16/23  1151   06/16/23  1020   06/16/23  0859   06/16/23  0856   06/16/23  0738        Albumin         4.1       Alkaline Phosphatase         224       ALT         145       AST         152       Bilirubin Direct         0.4       Bilirubin, Indirect         0.40       BILIRUBIN TOTAL         0.8       Cholesterol         197       Estimated Avg Glucose   286.2             Ferritin   578.85             HDL         50       Hemoglobin A1C External   11.6             HIV   Nonreactive             INR   0.91             Iron   80             Iron Binding Capacity Unsaturated   142             Iron Saturation   36             LDL Cholesterol External         102.00       Phosphorus   3.1             POC Glucose     202           POCT Glucose 258       202         PROTEIN TOTAL         7.4       Protime   12.1             Syphilis Antibody    Reactive             Thyroid Stimulating Hormone         4.655       TIBC   222             Total Cholesterol/HDL Ratio         4       Transferrin   187             Triglycerides         223       Very Low Density Lipoprotein         45                            Radiology:  Imaging Results              US Abdomen Limited (Final result)  Result time 06/16/23 09:12:53      Final result by Stephane Ashley MD (06/16/23 09:12:53)                   Impression:      1. Cholelithiasis with no definitive sonographic findings for cholecystitis.  No biliary dilatation.  2. Mild hepatic steatosis with liver size upper normal.      Electronically signed by: Stephane Ashley  Date:    06/16/2023  Time:    09:12               Narrative:    EXAMINATION:  US ABDOMEN LIMITED    CLINICAL HISTORY:  distended gallbladder, stone;    TECHNIQUE:  Gray-scale and color Doppler ultrasound images of the abdomen.    COMPARISON:  CT 06/16/2023    FINDINGS:  Liver size upper normal with increased overall hepatic echogenicity.  Main portal vein patent with normal flow direction.    Pancreas partially obscured with slightly heterogeneous overall pancreatic echogenicity.  No significant ascites.  Normal caliber of the superior IVC.    Two gallstones identified.  Largest measures 19 mm.  No gallbladder wall thickening or biliary dilatation.    No hydronephrosis or defined calcification in the right kidney.    Measurements:    - Liver: 16.6 cm    - CBD diameter: 5 mm    - Right kidney: 10.1 cm in length                                       CT Abdomen Pelvis With Contrast (Final result)  Result time 06/16/23 07:30:59      Final result by Ryanne George MD (06/16/23 07:30:59)                   Impression:      1. Small amount of fluid centered around the pancreas, suggestive of acute pancreatitis.  No drainable fluid collection.  2. Cholelithiasis with distended gallbladder.      Electronically signed by: Ryanne  Ariel  Date:    06/16/2023  Time:    07:30               Narrative:    EXAMINATION:  CT ABDOMEN PELVIS WITH CONTRAST    CLINICAL HISTORY:  Epigastric pain;    TECHNIQUE:  CT imaging was performed of the abdomen and pelvis after the administration of intravenous contrast. Dose length product is 311 mGycm. Automatic exposure control, adjustment of mA/kV or iterative reconstruction technique was used to limit radiation dose.    COMPARISON:  None    FINDINGS:  Liver: Unremarkable.    Gallbladder and biliary tree: Large gallstone is noted.  The gallbladder is distended.  No intra or extrahepatic biliary ductal dilation.    Pancreas: The pancreas enhances normally.    Spleen: Normal.    Adrenals: Normal.    Kidneys and ureters: No hydronephrosis.    Bladder: Normal.    Reproductive organs: No pelvic masses.    Stomach/bowel: No evidence of bowel obstruction. Appendix is normal. No discernible bowel inflammation.    Lymph nodes: No pathologically enlarged lymph node identified.    Peritoneum: There is a small amount of fluid surrounding the pancreas, duodenum and central mesentery    Vessels: No abdominal aortic aneurysm.    Abdominal wall: Normal.    Lung bases: No consolidation or pleural effusion.    Bones: No acute osseous findings.                                       X-Ray Chest AP Portable (Final result)  Result time 06/16/23 06:26:55      Final result by Ryanne George MD (06/16/23 06:26:55)                   Impression:      No acute abnormality of the chest.      Electronically signed by: Ryanne George  Date:    06/16/2023  Time:    06:26               Narrative:    EXAMINATION:  XR CHEST AP PORTABLE    CLINICAL HISTORY:  chest pain;    COMPARISON:  None    FINDINGS:  The heart is normal in size.  The lungs are clear.  There is no pleural effusion or visible pneumothorax.                        Wet Read by Ender Pelaez DO (06/16/23 03:11:59, [facility], Emergency Medicine)     Normal cardiomediastinal silhouette.  No dense lobar consolidation or pneumothorax.                                       Lines/Drains/Airways       Peripheral Intravenous Line  Duration                  Peripheral IV - Single Lumen 06/16/23 0230 20 G Right Antecubital <1 day                     Assessment & Plan:     Acute pancreatitis   -CT A/P performed showing small amount of fluid centered around the pancrease, suggestive of acute pancreatitis.  Also showed cholelithiasis with distended gallbladder   -Abdominal US performed showing cholelithiasis with no definitive sonographic findings for cholecystitis.  No biliary dilation.  Mild hepatic steatosis with liver size upper normal.   -given 2L NS in ED as well as 0.5mg Dilaudid   -patient experiencing substernal chest pain -- troponin, BNP, EKG wnl   -lipase >3000, , triglycerides 223, A1c 11.6, AST//145, ferritin 578.85  -will start LR @125cc/hr for continued hydration   -continue Dilaudid 1mg q6hrs PRN and morphine 2mg q4hrs PRN for pain management   -remain NPO, will advance diet as tolerated   -consider surgical consult if pain does not improve and/or symptoms worsen or labs become suggestive of acute cholecystitis.     T2DM  -A1c 11.6  -home regimen includes NPH 70/30 20 units BID, holding for now as patient is currently NPO  -continue LDSS and monitor CBG q4hrs     HTN  -blood pressure 195/104 on initial presentation likely secondary to pain   -hydralazine and labetalol PRN, will continue to monitor.     HLD  Hypertriglyceridemia   -lipid panel performed showing total cholesterol of 197 w/ LDL cholesterol of 102 and triglycerides of 223  -consider statin therapy one patient is able to tolerate PO    Syphilis   -syphilis antibody positive   -will contact health unit to investigate treatment history   -patient endorsing vaginal bleeding but is otherwise asymptomatic   -consider penicillin therapy while inpatient   -HIV negative,  chlamydia/gonorrhea ordered    Transaminitis   -AST/ALT elevated at 152/145 on presentation with alkaline phosphatase of 224  -likely secondary to acute pancreatitis and cholelithiasis   -hepatitis panel ordered for further workup     Hypokalemia   -potassium of 3.2 on admission   -given 40meq of IV potassium chloride   -will continue cardiac monitoring   -continue to monitor and replete as needed.     Code status: Full   DVT prophylaxis: Lovenox   Diet: NPO, advance as tolerated   Oxygenation: Room air   GI prophylaxis: none   Lines/drains: PIV  Consults: PT/OT     Disposition: Patient admitted for acute pancreatitis, will continue IVF and pain management.  Advance diet as tolerated.  Can discharge home once medically stable.     Dulce Burt MD  [facility], [facility]

## 2023-06-16 NOTE — PLAN OF CARE
06/16/23 1400   Discharge Assessment   Assessment Type Discharge Planning Assessment   Confirmed/corrected address, phone number and insurance Yes   Source of Information family; utilized   Reason For Admission Chest pain, Acute pancreatitis, Hyperglycemia due to diabetes mellitus   People in Home child(mara), adult   Facility Arrived From: Home   Do you expect to return to your current living situation? Yes   Do you have help at home or someone to help you manage your care at home? Yes   Who are your caregiver(s) and their phone number(s)? Carmencita Gaspar Daughter   [phone],  Chucky Gordillo Son   [phone]   Prior to hospitilization cognitive status: Alert/Oriented   Current cognitive status: Alert/Oriented   Equipment Currently Used at Home none   Readmission within 30 days? No   Patient currently being followed by outpatient case management? No   Do you currently have service(s) that help you manage your care at home? No   Do you take prescription medications? No   Do you have prescription coverage? No   Do you have any problems affording any of your prescribed medications? TBD   Who is going to help you get home at discharge? Family   How do you get to doctors appointments? family or friend will provide   Are you on dialysis? No   Discharge Plan A Home with family   DME Needed Upon Discharge  none   Discharge Plan discussed with: Adult children   Transition of Care Barriers Unisured   Physical Activity   On average, how many days per week do you engage in moderate to strenuous exercise (like a brisk walk)? 0 days   On average, how many minutes do you engage in exercise at this level? 0 min   Financial Resource Strain   How hard is it for you to pay for the very basics like food, housing, medical care, and heating? Not hard   Housing Stability   In the last 12 months, was there a time when you were not able to pay the mortgage or rent on time? N   In the last 12 months, how many places have you  lived? 1   In the last 12 months, was there a time when you did not have a steady place to sleep or slept in a shelter (including now)? N   Transportation Needs   In the past 12 months, has lack of transportation kept you from medical appointments or from getting medications? no   In the past 12 months, has lack of transportation kept you from meetings, work, or from getting things needed for daily living? No   Food Insecurity   Within the past 12 months, you worried that your food would run out before you got the money to buy more. Never true   Within the past 12 months, the food you bought just didn't last and you didn't have money to get more. Never true   Stress   Do you feel stress - tense, restless, nervous, or anxious, or unable to sleep at night because your mind is troubled all the time - these days? Not at all   Social Connections   In a typical week, how many times do you talk on the phone with family, friends, or neighbors? More than 3   How often do you get together with friends or relatives? More than 3   Do you belong to any clubs or organizations such as [employer] groups, unions, fraternal or athletic groups, or school groups? No   How often do you attend meetings of the clubs or organizations you belong to? Never   Are you , , , , never , or living with a partner? Never marrie   Alcohol Use   Q1: How often do you have a drink containing alcohol? Never   Q2: How many drinks containing alcohol do you have on a typical day when you are drinking? None   Q3: How often do you have six or more drinks on one occasion? Never   OTHER   Name(s) of People in Home Carmencita Gaspar Daughter   [phone],  Chucky Gordillo Son   [phone]     Pt single with 3 adult children; Resides with son, Chucky & dghtr, Carmencita; Hx obtained utilizing  with paresh; Pt/fly immigrated from [place]; Pt undocumented; Pending M/D application, per financial screening notes; Pt  independent with ADL's; CM to follow for d/c planning needs.

## 2023-06-17 LAB
ALBUMIN SERPL-MCNC: 2.8 G/DL (ref 3.4–4.8)
ALBUMIN/GLOB SERPL: 1 RATIO (ref 1.1–2)
ALP SERPL-CCNC: 151 UNIT/L (ref 40–150)
ALT SERPL-CCNC: 144 UNIT/L (ref 0–55)
AST SERPL-CCNC: 70 UNIT/L (ref 5–34)
BASOPHILS # BLD AUTO: 0 X10(3)/MCL
BASOPHILS NFR BLD AUTO: 0 %
BILIRUBIN DIRECT+TOT PNL SERPL-MCNC: 1.3 MG/DL
BUN SERPL-MCNC: 6.6 MG/DL (ref 9.8–20.1)
CALCIUM SERPL-MCNC: 8.2 MG/DL (ref 8.4–10.2)
CHLORIDE SERPL-SCNC: 103 MMOL/L (ref 98–107)
CO2 SERPL-SCNC: 24 MMOL/L (ref 23–31)
CREAT SERPL-MCNC: 0.69 MG/DL (ref 0.55–1.02)
EOSINOPHIL # BLD AUTO: 0.08 X10(3)/MCL (ref 0–0.9)
EOSINOPHIL NFR BLD AUTO: 1.1 %
ERYTHROCYTE [DISTWIDTH] IN BLOOD BY AUTOMATED COUNT: 13.2 % (ref 11.5–17)
GFR SERPLBLD CREATININE-BSD FMLA CKD-EPI: >60 MLS/MIN/1.73/M2
GLOBULIN SER-MCNC: 2.9 GM/DL (ref 2.4–3.5)
GLUCOSE SERPL-MCNC: 234 MG/DL (ref 82–115)
HCT VFR BLD AUTO: 31.5 % (ref 37–47)
HGB BLD-MCNC: 10.6 G/DL (ref 12–16)
IMM GRANULOCYTES # BLD AUTO: 0.04 X10(3)/MCL (ref 0–0.04)
IMM GRANULOCYTES NFR BLD AUTO: 0.6 %
LYMPHOCYTES # BLD AUTO: 1.45 X10(3)/MCL (ref 0.6–4.6)
LYMPHOCYTES NFR BLD AUTO: 20.5 %
MAGNESIUM SERPL-MCNC: 1.3 MG/DL (ref 1.6–2.6)
MCH RBC QN AUTO: 29.7 PG (ref 27–31)
MCHC RBC AUTO-ENTMCNC: 33.7 G/DL (ref 33–36)
MCV RBC AUTO: 88.2 FL (ref 80–94)
MONOCYTES # BLD AUTO: 0.51 X10(3)/MCL (ref 0.1–1.3)
MONOCYTES NFR BLD AUTO: 7.2 %
NEUTROPHILS # BLD AUTO: 4.99 X10(3)/MCL (ref 2.1–9.2)
NEUTROPHILS NFR BLD AUTO: 70.6 %
NRBC BLD AUTO-RTO: 0 %
PHOSPHATE SERPL-MCNC: 2.6 MG/DL (ref 2.3–4.7)
PLATELET # BLD AUTO: 171 X10(3)/MCL (ref 130–400)
PMV BLD AUTO: 10.1 FL (ref 7.4–10.4)
POCT GLUCOSE: 205 MG/DL (ref 70–110)
POCT GLUCOSE: 221 MG/DL (ref 70–110)
POCT GLUCOSE: 227 MG/DL (ref 70–110)
POCT GLUCOSE: 320 MG/DL (ref 70–110)
POCT GLUCOSE: 325 MG/DL (ref 70–110)
POTASSIUM SERPL-SCNC: 3 MMOL/L (ref 3.5–5.1)
PROT SERPL-MCNC: 5.7 GM/DL (ref 5.8–7.6)
RBC # BLD AUTO: 3.57 X10(6)/MCL (ref 4.2–5.4)
RPR SER QL: NORMAL
RPR SER-TITR: NORMAL {TITER}
SODIUM SERPL-SCNC: 137 MMOL/L (ref 136–145)
WBC # SPEC AUTO: 7.07 X10(3)/MCL (ref 4.5–11.5)

## 2023-06-17 PROCEDURE — 96372 THER/PROPH/DIAG INJ SC/IM: CPT

## 2023-06-17 PROCEDURE — 83735 ASSAY OF MAGNESIUM: CPT

## 2023-06-17 PROCEDURE — 21400001 HC TELEMETRY ROOM

## 2023-06-17 PROCEDURE — 84100 ASSAY OF PHOSPHORUS: CPT

## 2023-06-17 PROCEDURE — 63600175 PHARM REV CODE 636 W HCPCS

## 2023-06-17 PROCEDURE — 63600175 PHARM REV CODE 636 W HCPCS: Performed by: STUDENT IN AN ORGANIZED HEALTH CARE EDUCATION/TRAINING PROGRAM

## 2023-06-17 PROCEDURE — 25000003 PHARM REV CODE 250

## 2023-06-17 PROCEDURE — G0378 HOSPITAL OBSERVATION PER HR: HCPCS

## 2023-06-17 PROCEDURE — 80053 COMPREHEN METABOLIC PANEL: CPT

## 2023-06-17 PROCEDURE — 85025 COMPLETE CBC W/AUTO DIFF WBC: CPT

## 2023-06-17 RX ORDER — MAGNESIUM SULFATE HEPTAHYDRATE 40 MG/ML
2 INJECTION, SOLUTION INTRAVENOUS
Status: COMPLETED | OUTPATIENT
Start: 2023-06-17 | End: 2023-06-17

## 2023-06-17 RX ORDER — MAGNESIUM SULFATE HEPTAHYDRATE 40 MG/ML
2 INJECTION, SOLUTION INTRAVENOUS ONCE
Status: DISCONTINUED | OUTPATIENT
Start: 2023-06-17 | End: 2023-06-17

## 2023-06-17 RX ORDER — POTASSIUM CHLORIDE 7.45 MG/ML
10 INJECTION INTRAVENOUS
Status: COMPLETED | OUTPATIENT
Start: 2023-06-17 | End: 2023-06-17

## 2023-06-17 RX ORDER — IBUPROFEN 400 MG/1
400 TABLET ORAL ONCE
Status: COMPLETED | OUTPATIENT
Start: 2023-06-17 | End: 2023-06-17

## 2023-06-17 RX ADMIN — INSULIN ASPART 1 UNITS: 100 INJECTION, SOLUTION INTRAVENOUS; SUBCUTANEOUS at 09:06

## 2023-06-17 RX ADMIN — POTASSIUM CHLORIDE 10 MEQ: 7.46 INJECTION, SOLUTION INTRAVENOUS at 08:06

## 2023-06-17 RX ADMIN — SODIUM CHLORIDE, POTASSIUM CHLORIDE, SODIUM LACTATE AND CALCIUM CHLORIDE: 600; 310; 30; 20 INJECTION, SOLUTION INTRAVENOUS at 08:06

## 2023-06-17 RX ADMIN — SODIUM CHLORIDE, POTASSIUM CHLORIDE, SODIUM LACTATE AND CALCIUM CHLORIDE: 600; 310; 30; 20 INJECTION, SOLUTION INTRAVENOUS at 06:06

## 2023-06-17 RX ADMIN — INSULIN ASPART 2 UNITS: 100 INJECTION, SOLUTION INTRAVENOUS; SUBCUTANEOUS at 06:06

## 2023-06-17 RX ADMIN — MORPHINE SULFATE 2 MG: 2 INJECTION, SOLUTION INTRAMUSCULAR; INTRAVENOUS at 06:06

## 2023-06-17 RX ADMIN — INSULIN ASPART 4 UNITS: 100 INJECTION, SOLUTION INTRAVENOUS; SUBCUTANEOUS at 11:06

## 2023-06-17 RX ADMIN — MAGNESIUM SULFATE HEPTAHYDRATE 2 G: 40 INJECTION, SOLUTION INTRAVENOUS at 08:06

## 2023-06-17 RX ADMIN — SODIUM CHLORIDE, POTASSIUM CHLORIDE, SODIUM LACTATE AND CALCIUM CHLORIDE: 600; 310; 30; 20 INJECTION, SOLUTION INTRAVENOUS at 02:06

## 2023-06-17 RX ADMIN — MORPHINE SULFATE 2 MG: 2 INJECTION, SOLUTION INTRAMUSCULAR; INTRAVENOUS at 02:06

## 2023-06-17 RX ADMIN — HYDROMORPHONE HYDROCHLORIDE 1 MG: 1 INJECTION, SOLUTION INTRAMUSCULAR; INTRAVENOUS; SUBCUTANEOUS at 02:06

## 2023-06-17 RX ADMIN — IBUPROFEN 400 MG: 400 TABLET, FILM COATED ORAL at 09:06

## 2023-06-17 RX ADMIN — POTASSIUM CHLORIDE 10 MEQ: 7.46 INJECTION, SOLUTION INTRAVENOUS at 01:06

## 2023-06-17 RX ADMIN — POTASSIUM CHLORIDE 10 MEQ: 7.46 INJECTION, SOLUTION INTRAVENOUS at 11:06

## 2023-06-17 RX ADMIN — MAGNESIUM SULFATE HEPTAHYDRATE 2 G: 40 INJECTION, SOLUTION INTRAVENOUS at 11:06

## 2023-06-17 RX ADMIN — INSULIN ASPART 4 UNITS: 100 INJECTION, SOLUTION INTRAVENOUS; SUBCUTANEOUS at 04:06

## 2023-06-17 RX ADMIN — POTASSIUM CHLORIDE 10 MEQ: 7.46 INJECTION, SOLUTION INTRAVENOUS at 12:06

## 2023-06-17 RX ADMIN — ENOXAPARIN SODIUM 40 MG: 40 INJECTION SUBCUTANEOUS at 04:06

## 2023-06-17 NOTE — PROGRESS NOTES
[facility] Medicine Wards   Progress Note     Resident Team: [facility] Medicine List 3  Attending Physician: Claudia Schrader MD  Resident: Rony Mccray   Intern: Dian Burt      Date of Admit: 6/16/2023    Chief Complaint:     Hyperglycemia (Pt c/o chest pain and hyperglycemia, symptoms began approx 30 min prior to arrival. Pt took cbg at home and reports it read >500. Pt received 25 units of her insulin and drank water and pt's cbg 326 @ 0231 in triage. )    Subjective:      History of Present Illness:  Mary Gordillo is a 62 y.o. female with a history of T2DM and HLD who presented to [facility] ED on 6/16/2023  with complaint of diffuse abdominal pain.  Patient states she began to experience sudden onset epigastric pain and substernal chest pain overnight starting around 11pm.  She states she experienced a similar episode of pain approxx 1 week ago which resolved on its own after a few hours after drinking water.  This new episode has been constant over the last several hours and has been associated with 1 episode of N/V and shortness of breath.  Patient states she checked her blood sugar this morning and it was elevated >300.  Patient is on insulin therapy at home and was previously on metformin which was discontinued 2 months ago by PCP.  She denies any tobacco, alcohol, or recreational drug use.  She has no hx of abdominal surgeries but does endorse 1 episode of kidney dysfunction aproxx 1 year ago where she states she had to have a ureteral stent placed.   Patient denies any hematemesis or hematochezia and had her last bowel movement approx 1 day ago.  She denies any F/C, diarrhea, constipation, melena, abdominal distension, back pain, LE swelling, palpitations, or productive cough.  She is endorsing some vaginal bleeding starting a few days ago.  She states her last menstrual cycle was 10 years ago but does endorse an episode of chlamydia approxx 1 year ago.      In the ED, patient was initially found to be  hypertensive with BP of 195/104 and tachypnic at 24.  Other vitals were stable, satting well on room air.  Troponin and BNP performed in setting of chest pain which were both wnl.  EKG showed LBBB but no other major abnormalities noted.  Chest x-ray showed no acute processes.  CT AP performed showing findings concerning for pancreatitis and cholecystitis.  Abdominal US was performed showing gallstones but no acute processes or bile duct dilation.  Lipase was found to be >3000 and blood sugar was elevated at 348 along with AST/ALT at 152/145.  Internal medicine was consulted secondary to acute pancreatitis.      Interval History: NAEON.  Patient is endorsing new right-sided shoulder and neck pain with radiation into her right arm.  She states this pain started this morning and denies any trauma or injury to that area.  She states she was able to sleep well overnight and her abdominal pain is improving.  She endorses some nausea but denies any recent episodes of vomiting or diarrhea.  She denies any chest pain, SOB, F/C, dysuria, or frequency.      Past Medical History:   has a past medical history of Essential (primary) hypertension and Type 2 diabetes mellitus with unspecified diabetic retinopathy without macular edema.     Past Surgical History:   has no past surgical history on file.     Family History:  family history is not on file.     Social History:   reports that she has never smoked. She has never used smokeless tobacco. She reports that she does not use drugs.     Allergies:  has No Known Allergies.     Home Medications:  Prior to Admission medications    Medication Sig Start Date End Date Taking? Authorizing Provider   insulin NPH-insulin regular, 70/30, 100 unit/mL (70-30) injection Inject 20 Units into the skin 2 (two) times daily.   Yes Historical Provider     ROS is negative unless stated above      Objective:     Vital Signs (Most Recent):  Temp: 98.4 °F (36.9 °C) (06/17/23 0328)  Pulse: 88 (06/17/23  0328)  Resp: 18 (06/17/23 0625)  BP: (!) 100/58 (06/17/23 0328)  SpO2: (!) 93 % (06/17/23 0328) Vital Signs (24h Range):  Temp:  [97.9 °F (36.6 °C)-99 °F (37.2 °C)] 98.4 °F (36.9 °C)  Pulse:  [79-88] 88  Resp:  [12-20] 18  SpO2:  [93 %-99 %] 93 %  BP: ()/(56-85) 100/58     Physical Examination:  General: Patient resting in bed, in moderate distress secondary to abdominal pain  Eye: PERRLA, EOMI, clear conjunctiva, eyelids normal  HENT: Head-normocephalic and atraumatic  Neck: full range of motion, no thyromegaly or lymphadenopathy, trachea midline, supple, no palpable thyroid nodules  Respiratory: clear to auscultation bilaterally without wheezes, rales, rhonchi  Cardiovascular: regular rate and rhythm without murmurs.  No gallops or rubs no JVD.  Capillary refill within normal limits.  Gastrointestinal: mild tenderness to palpation more notable in the epigastric region.  Soft, non-distended, hypoactive bowel sounds.    Genitourinary: no CVA tenderness to palpation  Musculoskeletal: limited range of motion of right shoulder secondary to pain. 5/5 strength in right arm with sensation intact.  Tenderness to palpation over shoulder joint and neck area.  Other extremities without deficits.   Integumentary: no rashes or skin lesions present  Neurologic: no signs of peripheral neurological deficit, motor/sensory function intact  Psychiatric:  alert and oriented, cognitive function intact, cooperative with exam, good eye contact, judgement and insight intact, mood and affect full range.     Laboratory:  Most Recent Data:  Recent Lab Results  (Last 5 results in the past 24 hours)        06/17/23  0621   06/17/23  0457   06/17/23  0034   06/16/23  1925   06/16/23  1553        Albumin/Globulin Ratio   1.0             Albumin   2.8             Alkaline Phosphatase   151             ALT   144             AST   70             Baso #   0.00             Basophil %   0.0             BILIRUBIN TOTAL   1.3             BUN    6.6             Calcium   8.2             Chloride   103             CO2   24             Creatinine   0.69             eGFR   >60             Eos #   0.08             Eosinophil %   1.1             Estimated Avg Glucose               Ferritin               Globulin, Total   2.9             Glucose   234             Hematocrit   31.5             Hemoglobin   10.6             Hemoglobin A1C External               HIV               Immature Grans (Abs)   0.04             Immature Granulocytes   0.6             INR               Iron               Iron Binding Capacity Unsaturated               Iron Saturation               Lymph #   1.45             LYMPH %   20.5             Magnesium   1.30             MCH   29.7             MCHC   33.7             MCV   88.2             Mono #   0.51             Mono %   7.2             MPV   10.1             Neut #   4.99             Neut %   70.6             nRBC   0.0             Phosphorus   2.6             Platelets   171             POCT Glucose 227     205   185   210       Potassium   3.0             PROTEIN TOTAL   5.7             Protime               RBC   3.57             RDW   13.2             Sodium   137             Syphilis Antibody               TIBC               Transferrin               WBC   7.07                                  Radiology:  Imaging Results              US Abdomen Limited (Final result)  Result time 06/16/23 09:12:53      Final result by Stephane Ashley MD (06/16/23 09:12:53)                   Impression:      1. Cholelithiasis with no definitive sonographic findings for cholecystitis.  No biliary dilatation.  2. Mild hepatic steatosis with liver size upper normal.      Electronically signed by: Stephane Ashley  Date:    06/16/2023  Time:    09:12               Narrative:    EXAMINATION:  US ABDOMEN LIMITED    CLINICAL HISTORY:  distended gallbladder, stone;    TECHNIQUE:  Gray-scale and color Doppler ultrasound images of the  abdomen.    COMPARISON:  CT 06/16/2023    FINDINGS:  Liver size upper normal with increased overall hepatic echogenicity.  Main portal vein patent with normal flow direction.    Pancreas partially obscured with slightly heterogeneous overall pancreatic echogenicity.  No significant ascites.  Normal caliber of the superior IVC.    Two gallstones identified.  Largest measures 19 mm.  No gallbladder wall thickening or biliary dilatation.    No hydronephrosis or defined calcification in the right kidney.    Measurements:    - Liver: 16.6 cm    - CBD diameter: 5 mm    - Right kidney: 10.1 cm in length                                       CT Abdomen Pelvis With Contrast (Final result)  Result time 06/16/23 07:30:59      Final result by Ryanne George MD (06/16/23 07:30:59)                   Impression:      1. Small amount of fluid centered around the pancreas, suggestive of acute pancreatitis.  No drainable fluid collection.  2. Cholelithiasis with distended gallbladder.      Electronically signed by: Ryanne George  Date:    06/16/2023  Time:    07:30               Narrative:    EXAMINATION:  CT ABDOMEN PELVIS WITH CONTRAST    CLINICAL HISTORY:  Epigastric pain;    TECHNIQUE:  CT imaging was performed of the abdomen and pelvis after the administration of intravenous contrast. Dose length product is 311 mGycm. Automatic exposure control, adjustment of mA/kV or iterative reconstruction technique was used to limit radiation dose.    COMPARISON:  None    FINDINGS:  Liver: Unremarkable.    Gallbladder and biliary tree: Large gallstone is noted.  The gallbladder is distended.  No intra or extrahepatic biliary ductal dilation.    Pancreas: The pancreas enhances normally.    Spleen: Normal.    Adrenals: Normal.    Kidneys and ureters: No hydronephrosis.    Bladder: Normal.    Reproductive organs: No pelvic masses.    Stomach/bowel: No evidence of bowel obstruction. Appendix is normal. No discernible bowel  inflammation.    Lymph nodes: No pathologically enlarged lymph node identified.    Peritoneum: There is a small amount of fluid surrounding the pancreas, duodenum and central mesentery    Vessels: No abdominal aortic aneurysm.    Abdominal wall: Normal.    Lung bases: No consolidation or pleural effusion.    Bones: No acute osseous findings.                                       X-Ray Chest AP Portable (Final result)  Result time 06/16/23 06:26:55      Final result by Ryanne George MD (06/16/23 06:26:55)                   Impression:      No acute abnormality of the chest.      Electronically signed by: Ryanne George  Date:    06/16/2023  Time:    06:26               Narrative:    EXAMINATION:  XR CHEST AP PORTABLE    CLINICAL HISTORY:  chest pain;    COMPARISON:  None    FINDINGS:  The heart is normal in size.  The lungs are clear.  There is no pleural effusion or visible pneumothorax.                        Wet Read by Ender Pelaez DO (06/16/23 03:11:59, [facility], Emergency Medicine)    Normal cardiomediastinal silhouette.  No dense lobar consolidation or pneumothorax.                                       Lines/Drains/Airways       Peripheral Intravenous Line  Duration                  Peripheral IV - Single Lumen 06/16/23 0230 20 G Right Antecubital 1 day                     Assessment & Plan:     Acute pancreatitis   -CT A/P performed showing small amount of fluid centered around the pancrease, suggestive of acute pancreatitis.  Also showed cholelithiasis with distended gallbladder   -Abdominal US performed showing cholelithiasis with no definitive sonographic findings for cholecystitis.  No biliary dilation.  Mild hepatic steatosis with liver size upper normal.   -given 2L NS in ED as well as 0.5mg Dilaudid   -patient experiencing substernal chest pain -- troponin, BNP, EKG wnl   -lipase >3000, , triglycerides 223, A1c 11.6, AST//145, ferritin  578.85  -continue LR at 225cc/hr for continued hydration   -continue Dilaudid 1mg q6hrs PRN and morphine 2mg q4hrs PRN for pain management   -remain NPO, will advance diet as tolerated   -consider surgical consult Monday     T2DM  -A1c 11.6  -home regimen includes NPH 70/30 20 units BID, holding for now as patient is currently NPO  -continue LDSS and monitor CBG q4hrs     HTN  -blood pressure 195/104 on initial presentation likely secondary to pain   -hydralazine and labetalol PRN, will continue to monitor.     HLD  Hypertriglyceridemia   -lipid panel performed showing total cholesterol of 197 w/ LDL cholesterol of 102 and triglycerides of 223  -consider statin therapy one patient is able to tolerate PO    Syphilis   -syphilis antibody positive   -will contact health unit to investigate treatment history   -patient endorsing vaginal bleeding but is otherwise asymptomatic   -consider penicillin therapy while inpatient   -HIV negative, chlamydia/gonorrhea ordered    Transaminitis   -AST/ALT elevated at 152/145 on presentation with alkaline phosphatase of 224  -likely secondary to acute pancreatitis and cholelithiasis   -hepatitis panel negative     Hypokalemia   -potassium of 3.2 on admission -- 3.0 this morning   -given 40meq of IV potassium chloride   -will continue cardiac monitoring   -continue to monitor and replete as needed.     Code status: Full   DVT prophylaxis: Lovenox   Diet: NPO, advance as tolerated   Oxygenation: Room air   GI prophylaxis: none   Lines/drains: PIV  Consults: PT/OT     Disposition: Patient admitted for acute pancreatitis, will continue IVF and pain management.  Advance diet as tolerated.  Can discharge home once medically stable.     Dulce Burt MD  [facility], -1

## 2023-06-18 LAB
ALBUMIN SERPL-MCNC: 2.8 G/DL (ref 3.4–4.8)
ALBUMIN/GLOB SERPL: 0.8 RATIO (ref 1.1–2)
ALP SERPL-CCNC: 151 UNIT/L (ref 40–150)
ALT SERPL-CCNC: 100 UNIT/L (ref 0–55)
AST SERPL-CCNC: 31 UNIT/L (ref 5–34)
BASOPHILS # BLD AUTO: 0.01 X10(3)/MCL
BASOPHILS NFR BLD AUTO: 0.2 %
BILIRUBIN DIRECT+TOT PNL SERPL-MCNC: 0.8 MG/DL
BUN SERPL-MCNC: 4 MG/DL (ref 9.8–20.1)
C TRACH DNA SPEC QL NAA+PROBE: NOT DETECTED
CALCIUM SERPL-MCNC: 8.7 MG/DL (ref 8.4–10.2)
CHLORIDE SERPL-SCNC: 106 MMOL/L (ref 98–107)
CO2 SERPL-SCNC: 26 MMOL/L (ref 23–31)
CREAT SERPL-MCNC: 0.68 MG/DL (ref 0.55–1.02)
EOSINOPHIL # BLD AUTO: 0.1 X10(3)/MCL (ref 0–0.9)
EOSINOPHIL NFR BLD AUTO: 1.7 %
ERYTHROCYTE [DISTWIDTH] IN BLOOD BY AUTOMATED COUNT: 12.9 % (ref 11.5–17)
GFR SERPLBLD CREATININE-BSD FMLA CKD-EPI: >60 MLS/MIN/1.73/M2
GLOBULIN SER-MCNC: 3.3 GM/DL (ref 2.4–3.5)
GLUCOSE SERPL-MCNC: 258 MG/DL (ref 82–115)
HCT VFR BLD AUTO: 32.4 % (ref 37–47)
HGB BLD-MCNC: 10.9 G/DL (ref 12–16)
IMM GRANULOCYTES # BLD AUTO: 0.03 X10(3)/MCL (ref 0–0.04)
IMM GRANULOCYTES NFR BLD AUTO: 0.5 %
LYMPHOCYTES # BLD AUTO: 1.52 X10(3)/MCL (ref 0.6–4.6)
LYMPHOCYTES NFR BLD AUTO: 25.2 %
MAGNESIUM SERPL-MCNC: 1.6 MG/DL (ref 1.6–2.6)
MCH RBC QN AUTO: 29.7 PG (ref 27–31)
MCHC RBC AUTO-ENTMCNC: 33.6 G/DL (ref 33–36)
MCV RBC AUTO: 88.3 FL (ref 80–94)
MONOCYTES # BLD AUTO: 0.45 X10(3)/MCL (ref 0.1–1.3)
MONOCYTES NFR BLD AUTO: 7.5 %
N GONORRHOEA DNA SPEC QL NAA+PROBE: NOT DETECTED
NEUTROPHILS # BLD AUTO: 3.93 X10(3)/MCL (ref 2.1–9.2)
NEUTROPHILS NFR BLD AUTO: 64.9 %
NRBC BLD AUTO-RTO: 0 %
PATH REV: NORMAL
PHOSPHATE SERPL-MCNC: 2.3 MG/DL (ref 2.3–4.7)
PLATELET # BLD AUTO: 169 X10(3)/MCL (ref 130–400)
PMV BLD AUTO: 10.2 FL (ref 7.4–10.4)
POCT GLUCOSE: 241 MG/DL (ref 70–110)
POCT GLUCOSE: 293 MG/DL (ref 70–110)
POCT GLUCOSE: 325 MG/DL (ref 70–110)
POCT GLUCOSE: 335 MG/DL (ref 70–110)
POTASSIUM SERPL-SCNC: 2.9 MMOL/L (ref 3.5–5.1)
PROT SERPL-MCNC: 6.1 GM/DL (ref 5.8–7.6)
RBC # BLD AUTO: 3.67 X10(6)/MCL (ref 4.2–5.4)
SODIUM SERPL-SCNC: 139 MMOL/L (ref 136–145)
WBC # SPEC AUTO: 6.04 X10(3)/MCL (ref 4.5–11.5)

## 2023-06-18 PROCEDURE — 96372 THER/PROPH/DIAG INJ SC/IM: CPT

## 2023-06-18 PROCEDURE — 85025 COMPLETE CBC W/AUTO DIFF WBC: CPT

## 2023-06-18 PROCEDURE — 25000003 PHARM REV CODE 250

## 2023-06-18 PROCEDURE — 63600175 PHARM REV CODE 636 W HCPCS: Performed by: STUDENT IN AN ORGANIZED HEALTH CARE EDUCATION/TRAINING PROGRAM

## 2023-06-18 PROCEDURE — 83735 ASSAY OF MAGNESIUM: CPT

## 2023-06-18 PROCEDURE — 80053 COMPREHEN METABOLIC PANEL: CPT

## 2023-06-18 PROCEDURE — 84100 ASSAY OF PHOSPHORUS: CPT

## 2023-06-18 PROCEDURE — G0378 HOSPITAL OBSERVATION PER HR: HCPCS

## 2023-06-18 PROCEDURE — 63600175 PHARM REV CODE 636 W HCPCS

## 2023-06-18 PROCEDURE — 87591 N.GONORRHOEAE DNA AMP PROB: CPT

## 2023-06-18 PROCEDURE — 21400001 HC TELEMETRY ROOM

## 2023-06-18 PROCEDURE — 97116 GAIT TRAINING THERAPY: CPT

## 2023-06-18 RX ORDER — POTASSIUM CHLORIDE 7.45 MG/ML
10 INJECTION INTRAVENOUS
Status: COMPLETED | OUTPATIENT
Start: 2023-06-18 | End: 2023-06-18

## 2023-06-18 RX ORDER — MAGNESIUM SULFATE HEPTAHYDRATE 40 MG/ML
2 INJECTION, SOLUTION INTRAVENOUS
Status: COMPLETED | OUTPATIENT
Start: 2023-06-18 | End: 2023-06-18

## 2023-06-18 RX ORDER — POLYETHYLENE GLYCOL 3350 17 G/17G
17 POWDER, FOR SOLUTION ORAL DAILY
Status: DISCONTINUED | OUTPATIENT
Start: 2023-06-18 | End: 2023-06-20 | Stop reason: HOSPADM

## 2023-06-18 RX ORDER — DOCUSATE SODIUM 100 MG/1
100 CAPSULE, LIQUID FILLED ORAL DAILY
Status: DISCONTINUED | OUTPATIENT
Start: 2023-06-18 | End: 2023-06-20 | Stop reason: HOSPADM

## 2023-06-18 RX ORDER — POTASSIUM CHLORIDE 20 MEQ/1
40 TABLET, EXTENDED RELEASE ORAL ONCE
Status: COMPLETED | OUTPATIENT
Start: 2023-06-18 | End: 2023-06-18

## 2023-06-18 RX ORDER — ACETAMINOPHEN 325 MG/1
650 TABLET ORAL EVERY 6 HOURS PRN
Status: DISCONTINUED | OUTPATIENT
Start: 2023-06-18 | End: 2023-06-20 | Stop reason: HOSPADM

## 2023-06-18 RX ADMIN — INSULIN ASPART 3 UNITS: 100 INJECTION, SOLUTION INTRAVENOUS; SUBCUTANEOUS at 11:06

## 2023-06-18 RX ADMIN — INSULIN ASPART 4 UNITS: 100 INJECTION, SOLUTION INTRAVENOUS; SUBCUTANEOUS at 04:06

## 2023-06-18 RX ADMIN — SODIUM CHLORIDE, POTASSIUM CHLORIDE, SODIUM LACTATE AND CALCIUM CHLORIDE: 600; 310; 30; 20 INJECTION, SOLUTION INTRAVENOUS at 06:06

## 2023-06-18 RX ADMIN — POTASSIUM CHLORIDE 10 MEQ: 7.46 INJECTION, SOLUTION INTRAVENOUS at 11:06

## 2023-06-18 RX ADMIN — POTASSIUM CHLORIDE 10 MEQ: 7.46 INJECTION, SOLUTION INTRAVENOUS at 12:06

## 2023-06-18 RX ADMIN — POLYETHYLENE GLYCOL 3350 17 G: 17 POWDER, FOR SOLUTION ORAL at 08:06

## 2023-06-18 RX ADMIN — INSULIN DETEMIR 20 UNITS: 100 INJECTION, SOLUTION SUBCUTANEOUS at 08:06

## 2023-06-18 RX ADMIN — SODIUM CHLORIDE, POTASSIUM CHLORIDE, SODIUM LACTATE AND CALCIUM CHLORIDE: 600; 310; 30; 20 INJECTION, SOLUTION INTRAVENOUS at 02:06

## 2023-06-18 RX ADMIN — ENOXAPARIN SODIUM 40 MG: 40 INJECTION SUBCUTANEOUS at 04:06

## 2023-06-18 RX ADMIN — POTASSIUM CHLORIDE 10 MEQ: 7.46 INJECTION, SOLUTION INTRAVENOUS at 10:06

## 2023-06-18 RX ADMIN — POTASSIUM CHLORIDE 40 MEQ: 1500 TABLET, EXTENDED RELEASE ORAL at 07:06

## 2023-06-18 RX ADMIN — MAGNESIUM SULFATE HEPTAHYDRATE 2 G: 40 INJECTION, SOLUTION INTRAVENOUS at 08:06

## 2023-06-18 RX ADMIN — MAGNESIUM SULFATE HEPTAHYDRATE 2 G: 40 INJECTION, SOLUTION INTRAVENOUS at 07:06

## 2023-06-18 RX ADMIN — INSULIN ASPART 2 UNITS: 100 INJECTION, SOLUTION INTRAVENOUS; SUBCUTANEOUS at 08:06

## 2023-06-18 RX ADMIN — INSULIN ASPART 2 UNITS: 100 INJECTION, SOLUTION INTRAVENOUS; SUBCUTANEOUS at 06:06

## 2023-06-18 RX ADMIN — SODIUM CHLORIDE, POTASSIUM CHLORIDE, SODIUM LACTATE AND CALCIUM CHLORIDE: 600; 310; 30; 20 INJECTION, SOLUTION INTRAVENOUS at 10:06

## 2023-06-18 RX ADMIN — POTASSIUM CHLORIDE 10 MEQ: 7.46 INJECTION, SOLUTION INTRAVENOUS at 01:06

## 2023-06-18 RX ADMIN — DOCUSATE SODIUM 100 MG: 100 CAPSULE, LIQUID FILLED ORAL at 08:06

## 2023-06-18 NOTE — PLAN OF CARE
Problem: Adult Inpatient Plan of Care  Goal: Plan of Care Review  Outcome: Ongoing, Progressing  Goal: Patient-Specific Goal (Individualized)  Outcome: Ongoing, Progressing  Goal: Absence of Hospital-Acquired Illness or Injury  Outcome: Ongoing, Progressing  Goal: Optimal Comfort and Wellbeing  Outcome: Ongoing, Progressing  Goal: Readiness for Transition of Care  Outcome: Ongoing, Progressing     Problem: Pain Acute  Goal: Acceptable Pain Control and Functional Ability  Outcome: Ongoing, Progressing     Problem: Hyperglycemia  Goal: Blood Glucose Level Within Targeted Range  Outcome: Ongoing, Progressing

## 2023-06-18 NOTE — PT/OT/SLP PROGRESS
Physical Therapy Treatment    Patient Name:  Mary Gordillo   MRN:  [id number]    Recommendations     Discharge Recommendations:  home with home health   Discharge Equipment Recommendations: none   Barriers to discharge: fall risk, severity of deficits, and decreased endurance    Assessment     Mary Gordillo is a 62 y.o. female admitted with a medical diagnosis of     Acute pancreatitis   T2DM  HTN  HLD  Hypertriglyceridemia   Syphilis   Transaminitis   Hypokalemia   Hypomagnesemia   There is no problem list on file for this patient.    She presents with the following impairments/functional limitations:  weakness, impaired functional mobility, gait instability, impaired balance, impaired cardiopulmonary response to activity.    Rehab Prognosis: Good.    Patient would benefit from continued skilled acute PT services to: address above listed impairments/functional limitations; receive patient/caregiver education; reduce fall risk; and maximize independency/safety with functional mobility.    -continued: ambulation, with progression of gait distance/frequency/duration and speed, as tolerated/appropriate, with assistance and supervision    Recent Surgery: * No surgery found *      Plan     During this hospitalization, patient to be seen 3 x/week to address the identified impairments/functional limitations via gait training, therapeutic activities, therapeutic exercises and progress toward the established goals.    Plan of Care Expires:  07/14/23    Subjective     Communicated with patient's nurse Hammer prior to session.    Patient agreeable to participate in treatment session.    Chief Complaint: belly discomfort (not described as a 'pain' or rated by patient)  Patient/Family Comments/goals: home  Pain/Comfort:  Pain Rating 1: 0/10 (per patient - not described as pain, described as discomfort 'tired belly')  Pain Addressed 1: Nurse notified  Pain Rating Post-Intervention 1: 0/10    Objective       (Macedonian) UTILIZED:  [id number] - Leo    Patient found supine in bed, with HOB elevated, and bed rails up bilateral HOB with pt's son in room,  telemetry, peripheral IV  upon PT entry to room.    General Precautions: Standard,  ( required)   Orthopedic Precautions:N/A   Braces: N/A  Respiratory Status: room air    Functional Mobility:    Bed Mobility:  Rolling Left: independence  Scooting: modified independence  Supine to Sit: modified independence  Without cues    Transfers:  Sit to Stand: contact guard assistance with no assistive device  with no cues required    Gait:  Patient ambulated 130ft  Sitting rest x5 minutes  Patient ambulated 200ft  Sitting rest x4 minutes  Patient ambulated 200ft  With rolling walker and contact guard assistance.  Patient demonstrates steady gait, upright posture, reciprocal arm swing, no loss of balance, no mis-steps, decreased kathrin, decreased step length, and decreased foot/floor clearance.    Other Mobility:  not assessed    Balance:  Sit  Patient demonstrated static balance on level surface with independence with no verbal cues.  Patient demonstrated dynamic balance on level surface with independence with no verbal cues during moderate excursions.  Stand  Patient demonstrated static balance on level surface  using no device with modified independence with no verbal cues.    Patient left sitting in chair with all lines intact, call button in reach, tray table at bedside, pt's nurse Jaquelin notified, and pt's son present.    Goals     Multidisciplinary Problems       Physical Therapy Goals          Problem: Physical Therapy    Goal Priority Disciplines Outcome Goal Variances Interventions   Physical Therapy Goal     PT, PT/OT Ongoing, Progressing     Description: Goals to be met by: DISCHARGE    Patient will increase functional independence with mobility by performin. Supine to sit with Modified [place] - MET 2023  2. Sit to supine with Modified  [place] - ONGOING  3. Sit to stand transfer with Modified [place] - ONGOING  4. Gait  x 300 feet with Modified [place] using No Assistive Device - ONGOING                     Time Tracking     PT Received On: 06/18/23  PT Start Time: 0912     PT Stop Time: 0935  PT Total Time (min): 23 min     Billable Minutes: Gait Training 23 06/18/2023

## 2023-06-18 NOTE — PROGRESS NOTES
[facility]   Progress Note     Resident Team: [facility] List 3  Attending Physician: Claudia Schrader MD  Resident: Rony Mccray   Intern: Dian Burt      Date of Admit: 6/16/2023    Chief Complaint:     Hyperglycemia (Pt c/o chest pain and hyperglycemia, symptoms began approx 30 min prior to arrival. Pt took cbg at home and reports it read >500. Pt received 25 units of her insulin and drank water and pt's cbg 326 @ 0231 in triage. )    Subjective:      History of Present Illness:  Mary Gordillo is a 62 y.o. female with a history of T2DM and HLD who presented to [facility] ED on 6/16/2023  with complaint of diffuse abdominal pain.  Patient states she began to experience sudden onset epigastric pain and substernal chest pain overnight starting around 11pm.  She states she experienced a similar episode of pain approxx 1 week ago which resolved on its own after a few hours after drinking water.  This new episode has been constant over the last several hours and has been associated with 1 episode of N/V and shortness of breath.  Patient states she checked her blood sugar this morning and it was elevated >300.  Patient is on insulin therapy at home and was previously on metformin which was discontinued 2 months ago by PCP.  She denies any tobacco, alcohol, or recreational drug use.  She has no hx of abdominal surgeries but does endorse 1 episode of kidney dysfunction aproxx 1 year ago where she states she had to have a ureteral stent placed.   Patient denies any hematemesis or hematochezia and had her last bowel movement approx 1 day ago.  She denies any F/C, diarrhea, constipation, melena, abdominal distension, back pain, LE swelling, palpitations, or productive cough.  She is endorsing some vaginal bleeding starting a few days ago.  She states her last menstrual cycle was 10 years ago but does endorse an episode of chlamydia approxx 1 year ago.      In the ED, patient was initially found to be  "hypertensive with BP of 195/104 and tachypnic at 24.  Other vitals were stable, satting well on room air.  Troponin and BNP performed in setting of chest pain which were both wnl.  EKG showed LBBB but no other major abnormalities noted.  Chest x-ray showed no acute processes.  CT AP performed showing findings concerning for pancreatitis and cholecystitis.  Abdominal US was performed showing gallstones but no acute processes or bile duct dilation.  Lipase was found to be >3000 and blood sugar was elevated at 348 along with AST/ALT at 152/145.  Internal medicine was consulted secondary to acute pancreatitis.      Interval History: NAEON.  Patient states abdominal pain is significantly improved today but is still having "dull ache" in epigastric region.  Able to tolerate liquid diet, will transition to solid diet today and continue to monitor.  Denies any f/C, N/V, diarrhea, chest pain, sob, dysuria.  Patient is endorsing some constipation and states she has not had a bowel movement since Thursday, will start bowel regimen today.  Also endorsing some yellow vaginal discharge with itching.  Pending chlamydia/gonhorrea test.  Right shoulder and arm pain is improved today. Consulting surgery today in regards to cholelithiasis on imaging.      Past Medical History:   has a past medical history of Essential (primary) hypertension and Type 2 diabetes mellitus with unspecified diabetic retinopathy without macular edema.     Past Surgical History:   has no past surgical history on file.     Family History:  family history is not on file.     Social History:   reports that she has never smoked. She has never used smokeless tobacco. She reports that she does not use drugs.     Allergies:  has No Known Allergies.     Home Medications:  Prior to Admission medications    Medication Sig Start Date End Date Taking? Authorizing Provider   insulin NPH-insulin regular, 70/30, 100 unit/mL (70-30) injection Inject 20 Units into the skin 2 " (two) times daily.   Yes Historical Provider     ROS is negative unless stated above      Objective:     Vital Signs (Most Recent):  Temp: 98.1 °F (36.7 °C) (06/18/23 0743)  Pulse: 77 (06/18/23 0743)  Resp: 18 (06/18/23 0339)  BP: 130/75 (06/18/23 0743)  SpO2: 96 % (06/18/23 0743) Vital Signs (24h Range):  Temp:  [98.1 °F (36.7 °C)-99 °F (37.2 °C)] 98.1 °F (36.7 °C)  Pulse:  [74-82] 77  Resp:  [16-20] 18  SpO2:  [94 %-97 %] 96 %  BP: (115-155)/(66-78) 130/75     Physical Examination:  General: Patient resting in bed, in no acute distress  Eye: PERRLA, EOMI, clear conjunctiva, eyelids normal  HENT: Head-normocephalic and atraumatic  Neck: full range of motion, no thyromegaly or lymphadenopathy, trachea midline, supple, no palpable thyroid nodules  Respiratory: clear to auscultation bilaterally without wheezes, rales, rhonchi  Cardiovascular: regular rate and rhythm without murmurs.  No gallops or rubs no JVD.  Capillary refill within normal limits.  Gastrointestinal: mild tenderness to palpation more notable in the epigastric region.  Soft, non-distended, hypoactive bowel sounds.    Genitourinary: no CVA tenderness to palpation  Musculoskeletal: extremities without deficits.  Integumentary: no rashes or skin lesions present  Neurologic: no signs of peripheral neurological deficit, motor/sensory function intact  Psychiatric:  alert and oriented, cognitive function intact, cooperative with exam, good eye contact, judgement and insight intact, mood and affect full range.     Laboratory:  Most Recent Data:  Recent Lab Results  (Last 5 results in the past 24 hours)        06/18/23  0625   06/18/23  0327   06/17/23  2039   06/17/23  1532   06/17/23  1118        Albumin/Globulin Ratio   0.8             Albumin   2.8             Alkaline Phosphatase   151             ALT   100             AST   31             Baso #   0.01             Basophil %   0.2             BILIRUBIN TOTAL   0.8             BUN   4.0              Calcium   8.7             Chloride   106             CO2   26             Creatinine   0.68             eGFR   >60             Eos #   0.10             Eosinophil %   1.7             Globulin, Total   3.3             Glucose   258             Hematocrit   32.4             Hemoglobin   10.9             Immature Grans (Abs)   0.03             Immature Granulocytes   0.5             Lymph #   1.52             LYMPH %   25.2             Magnesium   1.60             MCH   29.7             MCHC   33.6             MCV   88.3             Mono #   0.45             Mono %   7.5             MPV   10.2             Neut #   3.93             Neut %   64.9             nRBC   0.0             Phosphorus   2.3             Platelets   169             POCT Glucose 241     221   325   320       Potassium   2.9             PROTEIN TOTAL   6.1             RBC   3.67             RDW   12.9             Sodium   139             WBC   6.04                                  Radiology:  Imaging Results              US Abdomen Limited (Final result)  Result time 06/16/23 09:12:53      Final result by Stephane Ashley MD (06/16/23 09:12:53)                   Impression:      1. Cholelithiasis with no definitive sonographic findings for cholecystitis.  No biliary dilatation.  2. Mild hepatic steatosis with liver size upper normal.      Electronically signed by: Stephane Ashley  Date:    06/16/2023  Time:    09:12               Narrative:    EXAMINATION:  US ABDOMEN LIMITED    CLINICAL HISTORY:  distended gallbladder, stone;    TECHNIQUE:  Gray-scale and color Doppler ultrasound images of the abdomen.    COMPARISON:  CT 06/16/2023    FINDINGS:  Liver size upper normal with increased overall hepatic echogenicity.  Main portal vein patent with normal flow direction.    Pancreas partially obscured with slightly heterogeneous overall pancreatic echogenicity.  No significant ascites.  Normal caliber of the superior IVC.    Two gallstones identified.   Largest measures 19 mm.  No gallbladder wall thickening or biliary dilatation.    No hydronephrosis or defined calcification in the right kidney.    Measurements:    - Liver: 16.6 cm    - CBD diameter: 5 mm    - Right kidney: 10.1 cm in length                                       CT Abdomen Pelvis With Contrast (Final result)  Result time 06/16/23 07:30:59      Final result by Ryanne George MD (06/16/23 07:30:59)                   Impression:      1. Small amount of fluid centered around the pancreas, suggestive of acute pancreatitis.  No drainable fluid collection.  2. Cholelithiasis with distended gallbladder.      Electronically signed by: Ryanne George  Date:    06/16/2023  Time:    07:30               Narrative:    EXAMINATION:  CT ABDOMEN PELVIS WITH CONTRAST    CLINICAL HISTORY:  Epigastric pain;    TECHNIQUE:  CT imaging was performed of the abdomen and pelvis after the administration of intravenous contrast. Dose length product is 311 mGycm. Automatic exposure control, adjustment of mA/kV or iterative reconstruction technique was used to limit radiation dose.    COMPARISON:  None    FINDINGS:  Liver: Unremarkable.    Gallbladder and biliary tree: Large gallstone is noted.  The gallbladder is distended.  No intra or extrahepatic biliary ductal dilation.    Pancreas: The pancreas enhances normally.    Spleen: Normal.    Adrenals: Normal.    Kidneys and ureters: No hydronephrosis.    Bladder: Normal.    Reproductive organs: No pelvic masses.    Stomach/bowel: No evidence of bowel obstruction. Appendix is normal. No discernible bowel inflammation.    Lymph nodes: No pathologically enlarged lymph node identified.    Peritoneum: There is a small amount of fluid surrounding the pancreas, duodenum and central mesentery    Vessels: No abdominal aortic aneurysm.    Abdominal wall: Normal.    Lung bases: No consolidation or pleural effusion.    Bones: No acute osseous findings.                                        X-Ray Chest AP Portable (Final result)  Result time 06/16/23 06:26:55      Final result by Ryanne George MD (06/16/23 06:26:55)                   Impression:      No acute abnormality of the chest.      Electronically signed by: Ryanne George  Date:    06/16/2023  Time:    06:26               Narrative:    EXAMINATION:  XR CHEST AP PORTABLE    CLINICAL HISTORY:  chest pain;    COMPARISON:  None    FINDINGS:  The heart is normal in size.  The lungs are clear.  There is no pleural effusion or visible pneumothorax.                        Wet Read by Ender Pelaez DO (06/16/23 03:11:59, [facility], Emergency Medicine)    Normal cardiomediastinal silhouette.  No dense lobar consolidation or pneumothorax.                                       Lines/Drains/Airways       Peripheral Intravenous Line  Duration                  Peripheral IV - Single Lumen 06/16/23 0230 20 G Right Antecubital 2 days                     Assessment & Plan:     Acute pancreatitis   -CT A/P performed showing small amount of fluid centered around the pancrease, suggestive of acute pancreatitis.  Also showed cholelithiasis with distended gallbladder   -Abdominal US performed showing cholelithiasis with no definitive sonographic findings for cholecystitis.  No biliary dilation.  Mild hepatic steatosis with liver size upper normal.   -given 2L NS in ED as well as 0.5mg Dilaudid   -patient experiencing substernal chest pain -- troponin, BNP, EKG wnl   -lipase >3000, , triglycerides 223, A1c 11.6, AST//145, ferritin 578.85  -continue LR at 225cc/hr for continued hydration   -continue Dilaudid 1mg q6hrs PRN and morphine 2mg q4hrs PRN for pain management -- pain appears improved today   -patient tolerating clear liquid diet, will attempt to advance to solid diet today.   -will consult general surgery today in setting of cholelithiasis. Appreciate recommendations.     T2DM  -A1c 11.6  -home regimen  includes NPH 70/30 20 units BID, holding for now.  -starting 20 units detemir for better blood sugar control as patient is now tolerating diet.    -continue LDSS and monitor CBG q4hrs     HTN  -blood pressure 195/104 on initial presentation likely secondary to pain  -hydralazine and labetalol PRN, will continue to monitor.     HLD  Hypertriglyceridemia   -lipid panel performed showing total cholesterol of 197 w/ LDL cholesterol of 102 and triglycerides of 223  -ASCVD risk 5.9%, can consider addition of moderate-intensity statin     Syphilis   -syphilis antibody positive, RPR non-reactive  -patient denies prior treatment history, will contact health unit Monday to investigate treatment history  -patient endorsing vaginal bleeding and yellow discharge but is otherwise asymptomatic   -consider penicillin therapy while inpatient   -HIV negative, chlamydia/gonorrhea pending    Transaminitis   -AST/ALT elevated at 152/145 on presentation with alkaline phosphatase of 224 -- trending down   -likely secondary to acute pancreatitis and cholelithiasis   -hepatitis panel negative     Hypokalemia   Hypomagnesemia   -potassium of 3.2 on admission -- 2.9 this morning   -given 40meq of IV potassium chloride as well as 20meq oral potassium    -giving 4g magnesium phosphate today   -will continue cardiac monitoring   -continue to monitor and replete as needed.     Code status: Full   DVT prophylaxis: Lovenox   Diet: NPO, advance as tolerated   Oxygenation: Room air   GI prophylaxis: none   Lines/drains: PIV  Consults: PT/OT     Disposition: Patient admitted for acute pancreatitis, will continue IVF and pain management.  Advance diet as tolerated. Surgery consulted today for recommendations regarding cholelithiasis. Can discharge home once medically stable.     Dulce Burt MD  [facility], HO-1

## 2023-06-18 NOTE — PLAN OF CARE
Problem: Adult Inpatient Plan of Care  Goal: Absence of Hospital-Acquired Illness or Injury  Outcome: Ongoing, Progressing  Goal: Optimal Comfort and Wellbeing  Outcome: Ongoing, Progressing  Goal: Readiness for Transition of Care  Outcome: Ongoing, Progressing     Problem: Pain Acute  Goal: Acceptable Pain Control and Functional Ability  Outcome: Ongoing, Progressing     Problem: Hyperglycemia  Goal: Blood Glucose Level Within Targeted Range  Outcome: Ongoing, Progressing

## 2023-06-18 NOTE — CONSULTS
"[facility]  General Surgery - [facility] Team  H&P Note  Admit Date: 6/16/2023  #0    Patient Name: Mary Gordillo  YOB: 1960  Date: 06/18/2023 9:05 AM  Date of Admission: 6/16/2023    PRESENTING HISTORY   Reason for Consult: Gallstone pancreatitis    History of Present Illness:  Ms. Gordillo is a 62-year-old female with a history of DM and HTN who presented on 6/16 with sudden onset of epigastric abdominal pain. The patient had an episode of of epigastric pain 1 week prior that resolved on its own. She was found to have WBC wnl at 6.5, elevated AST/ALT at 152/145 and lipase > 3000 consistent with acute pancreatitis. Abdominal US and CT A/P show cholelithiasis with large gallstone noted in gallbladder and distended gallbladder. The patient drinks on special occasions. She has no heart or lung conditions.  Surgical history is significant for tubal ligation.     Today, the patient states her pain is better. She is tolerating a regular diet with mild feeling of fullness after eating.     Review of Systems:  12 point ROS negative except as stated in HPI    PAST HISTORY:   Past medical history:  Past Medical History:   Diagnosis Date    Essential (primary) hypertension     Type 2 diabetes mellitus with unspecified diabetic retinopathy without macular edema        Past surgical history:  History reviewed. No pertinent surgical history.    Social history:  Tobacco: None  Alcohol: On special occasions  Drug use: None    MEDICATIONS & ALLERGIES:   Home meds: See chart    Allergies: NKDA    OBJECTIVE:   Vital Signs:  VITAL SIGNS: 24 HR MIN & MAX LAST   Temp  Min: 98.1 °F (36.7 °C)  Max: 99 °F (37.2 °C)  98.1 °F (36.7 °C)   BP  Min: 115/66  Max: 155/78  130/75    Pulse  Min: 74  Max: 82  77    Resp  Min: 16  Max: 20  18    SpO2  Min: 94 %  Max: 97 %  96 %      HT: 5' 5" (165.1 cm)  WT: 77.7 kg (171 lb 4.8 oz)  BMI: 28.5     Physical Exam:  General: Well developed, well nourished, no acute " distress  HEENT: NCAT, PERRL  CV: RR  Resp: NWOB on RA  GI:  soft, mildly TTP around epigastric region and RUQ, mildly distended  :  Deferred  MSK: Moves all extremities spontaneously and purposefully  Skin/wounds:  No rashes, ulcers, erythema  Neuro:  CNII-XII grossly intact, A&Ox3    Labs:  Recent Labs     06/16/23  0239 06/16/23  0738 06/16/23  1020 06/17/23  0457 06/18/23  0327   WBC 6.52  --   --  7.07 6.04   HGB 13.2  --   --  10.6* 10.9*   HCT 38.8  --   --  31.5* 32.4*     --   --  171 169     --   --  137 139   K 3.2*  --   --  3.0* 2.9*   CO2 24  --   --  24 26   BUN 13.7  --   --  6.6* 4.0*   CREATININE 0.90  --   --  0.69 0.68   BILITOT  --  0.8  --  1.3 0.8   AST  --  152*  --  70* 31   ALT  --  145*  --  144* 100*   ALKPHOS  --  224*  --  151* 151*   CALCIUM 9.9  --   --  8.2* 8.7   ALBUMIN  --  4.1  --  2.8* 2.8*   MG 1.60  --   --  1.30* 1.60   PHOS  --   --  3.1 2.6 2.3       Imaging:  US Abdomen Limited   Final Result      1. Cholelithiasis with no definitive sonographic findings for cholecystitis.  No biliary dilatation.   2. Mild hepatic steatosis with liver size upper normal.         Electronically signed by: Stephane Ashley   Date:    06/16/2023   Time:    09:12      CT Abdomen Pelvis With Contrast   Final Result      1. Small amount of fluid centered around the pancreas, suggestive of acute pancreatitis.  No drainable fluid collection.   2. Cholelithiasis with distended gallbladder.         Electronically signed by: Ryanne George   Date:    06/16/2023   Time:    07:30      X-Ray Chest AP Portable   ED Interpretation   Normal cardiomediastinal silhouette.  No dense lobar consolidation or pneumothorax.      Final Result      No acute abnormality of the chest.         Electronically signed by: Ryanne George   Date:    06/16/2023   Time:    06:26         I have reviewed all pertinent imaging results/findings within the past 24 hours.      ASSESSMENT & PLAN:    62-year-old female  with HTN and DM who was admitted for gallstone pancreatitis. The patient's acute abdominal pain has improved and she is tolerating a regular diet.     - Will book and consent patient for laparoscopic cholecystectomy tomorrow   - K low at 2.9, will need electrolyte replacement prior to surgery         Patricia Leach MD   [employer] General Surgery, PGY-1

## 2023-06-19 ENCOUNTER — ANESTHESIA EVENT (OUTPATIENT)
Dept: SURGERY | Facility: HOSPITAL | Age: 63
DRG: 418 | End: 2023-06-19

## 2023-06-19 ENCOUNTER — ANESTHESIA (OUTPATIENT)
Dept: SURGERY | Facility: HOSPITAL | Age: 63
DRG: 418 | End: 2023-06-19

## 2023-06-19 LAB
ALBUMIN SERPL-MCNC: 2.9 G/DL (ref 3.4–4.8)
ALBUMIN SERPL-MCNC: 2.9 G/DL (ref 3.4–4.8)
ALBUMIN/GLOB SERPL: 0.8 RATIO (ref 1.1–2)
ALBUMIN/GLOB SERPL: 0.9 RATIO (ref 1.1–2)
ALP SERPL-CCNC: 148 UNIT/L (ref 40–150)
ALP SERPL-CCNC: 155 UNIT/L (ref 40–150)
ALT SERPL-CCNC: 72 UNIT/L (ref 0–55)
ALT SERPL-CCNC: 75 UNIT/L (ref 0–55)
ANION GAP SERPL CALC-SCNC: 9 MEQ/L
AST SERPL-CCNC: 16 UNIT/L (ref 5–34)
AST SERPL-CCNC: 17 UNIT/L (ref 5–34)
BASOPHILS # BLD AUTO: 0.01 X10(3)/MCL
BASOPHILS # BLD AUTO: 0.01 X10(3)/MCL
BASOPHILS NFR BLD AUTO: 0.2 %
BASOPHILS NFR BLD AUTO: 0.2 %
BILIRUBIN DIRECT+TOT PNL SERPL-MCNC: 0.5 MG/DL
BILIRUBIN DIRECT+TOT PNL SERPL-MCNC: 0.6 MG/DL
BUN SERPL-MCNC: 6 MG/DL (ref 9.8–20.1)
BUN SERPL-MCNC: 7.3 MG/DL (ref 9.8–20.1)
BUN SERPL-MCNC: 8.8 MG/DL (ref 9.8–20.1)
CALCIUM SERPL-MCNC: 8.6 MG/DL (ref 8.4–10.2)
CALCIUM SERPL-MCNC: 8.8 MG/DL (ref 8.4–10.2)
CALCIUM SERPL-MCNC: 8.9 MG/DL (ref 8.4–10.2)
CHLORIDE SERPL-SCNC: 104 MMOL/L (ref 98–107)
CHLORIDE SERPL-SCNC: 104 MMOL/L (ref 98–107)
CHLORIDE SERPL-SCNC: 108 MMOL/L (ref 98–107)
CO2 SERPL-SCNC: 22 MMOL/L (ref 23–31)
CO2 SERPL-SCNC: 24 MMOL/L (ref 23–31)
CO2 SERPL-SCNC: 25 MMOL/L (ref 23–31)
CREAT SERPL-MCNC: 0.71 MG/DL (ref 0.55–1.02)
CREAT SERPL-MCNC: 0.72 MG/DL (ref 0.55–1.02)
CREAT SERPL-MCNC: 0.86 MG/DL (ref 0.55–1.02)
CREAT/UREA NIT SERPL: 8
EOSINOPHIL # BLD AUTO: 0.11 X10(3)/MCL (ref 0–0.9)
EOSINOPHIL # BLD AUTO: 0.15 X10(3)/MCL (ref 0–0.9)
EOSINOPHIL NFR BLD AUTO: 1.7 %
EOSINOPHIL NFR BLD AUTO: 2.7 %
ERYTHROCYTE [DISTWIDTH] IN BLOOD BY AUTOMATED COUNT: 12.7 % (ref 11.5–17)
ERYTHROCYTE [DISTWIDTH] IN BLOOD BY AUTOMATED COUNT: 12.8 % (ref 11.5–17)
GFR SERPLBLD CREATININE-BSD FMLA CKD-EPI: >60 MLS/MIN/1.73/M2
GLOBULIN SER-MCNC: 3.4 GM/DL (ref 2.4–3.5)
GLOBULIN SER-MCNC: 3.5 GM/DL (ref 2.4–3.5)
GLUCOSE SERPL-MCNC: 227 MG/DL (ref 82–115)
GLUCOSE SERPL-MCNC: 284 MG/DL (ref 82–115)
GLUCOSE SERPL-MCNC: 347 MG/DL (ref 82–115)
HCT VFR BLD AUTO: 32 % (ref 37–47)
HCT VFR BLD AUTO: 32.6 % (ref 37–47)
HGB BLD-MCNC: 10.9 G/DL (ref 12–16)
HGB BLD-MCNC: 11.1 G/DL (ref 12–16)
IMM GRANULOCYTES # BLD AUTO: 0.02 X10(3)/MCL (ref 0–0.04)
IMM GRANULOCYTES # BLD AUTO: 0.03 X10(3)/MCL (ref 0–0.04)
IMM GRANULOCYTES NFR BLD AUTO: 0.4 %
IMM GRANULOCYTES NFR BLD AUTO: 0.5 %
LYMPHOCYTES # BLD AUTO: 1.78 X10(3)/MCL (ref 0.6–4.6)
LYMPHOCYTES # BLD AUTO: 1.95 X10(3)/MCL (ref 0.6–4.6)
LYMPHOCYTES NFR BLD AUTO: 30.1 %
LYMPHOCYTES NFR BLD AUTO: 31.5 %
MAGNESIUM SERPL-MCNC: 1.7 MG/DL (ref 1.6–2.6)
MAGNESIUM SERPL-MCNC: 1.7 MG/DL (ref 1.6–2.6)
MCH RBC QN AUTO: 29.8 PG (ref 27–31)
MCH RBC QN AUTO: 30.1 PG (ref 27–31)
MCHC RBC AUTO-ENTMCNC: 34 G/DL (ref 33–36)
MCHC RBC AUTO-ENTMCNC: 34.1 G/DL (ref 33–36)
MCV RBC AUTO: 87.6 FL (ref 80–94)
MCV RBC AUTO: 88.4 FL (ref 80–94)
MONOCYTES # BLD AUTO: 0.41 X10(3)/MCL (ref 0.1–1.3)
MONOCYTES # BLD AUTO: 0.47 X10(3)/MCL (ref 0.1–1.3)
MONOCYTES NFR BLD AUTO: 7.3 %
MONOCYTES NFR BLD AUTO: 7.3 %
NEUTROPHILS # BLD AUTO: 3.28 X10(3)/MCL (ref 2.1–9.2)
NEUTROPHILS # BLD AUTO: 3.9 X10(3)/MCL (ref 2.1–9.2)
NEUTROPHILS NFR BLD AUTO: 57.9 %
NEUTROPHILS NFR BLD AUTO: 60.2 %
NRBC BLD AUTO-RTO: 0 %
NRBC BLD AUTO-RTO: 0 %
PHOSPHATE SERPL-MCNC: 2.5 MG/DL (ref 2.3–4.7)
PHOSPHATE SERPL-MCNC: 3 MG/DL (ref 2.3–4.7)
PLATELET # BLD AUTO: 179 X10(3)/MCL (ref 130–400)
PLATELET # BLD AUTO: 181 X10(3)/MCL (ref 130–400)
PMV BLD AUTO: 9.8 FL (ref 7.4–10.4)
PMV BLD AUTO: 9.8 FL (ref 7.4–10.4)
POCT GLUCOSE: 175 MG/DL (ref 70–110)
POCT GLUCOSE: 184 MG/DL (ref 70–110)
POCT GLUCOSE: 205 MG/DL (ref 70–110)
POCT GLUCOSE: 229 MG/DL (ref 70–110)
POCT GLUCOSE: 232 MG/DL (ref 70–110)
POTASSIUM SERPL-SCNC: 3.2 MMOL/L (ref 3.5–5.1)
POTASSIUM SERPL-SCNC: 3.2 MMOL/L (ref 3.5–5.1)
POTASSIUM SERPL-SCNC: 3.5 MMOL/L (ref 3.5–5.1)
PROT SERPL-MCNC: 6.3 GM/DL (ref 5.8–7.6)
PROT SERPL-MCNC: 6.4 GM/DL (ref 5.8–7.6)
RBC # BLD AUTO: 3.62 X10(6)/MCL (ref 4.2–5.4)
RBC # BLD AUTO: 3.72 X10(6)/MCL (ref 4.2–5.4)
SODIUM SERPL-SCNC: 136 MMOL/L (ref 136–145)
SODIUM SERPL-SCNC: 138 MMOL/L (ref 136–145)
SODIUM SERPL-SCNC: 139 MMOL/L (ref 136–145)
WBC # SPEC AUTO: 5.65 X10(3)/MCL (ref 4.5–11.5)
WBC # SPEC AUTO: 6.47 X10(3)/MCL (ref 4.5–11.5)

## 2023-06-19 PROCEDURE — 63600175 PHARM REV CODE 636 W HCPCS: Performed by: STUDENT IN AN ORGANIZED HEALTH CARE EDUCATION/TRAINING PROGRAM

## 2023-06-19 PROCEDURE — 63600175 PHARM REV CODE 636 W HCPCS: Performed by: SPECIALIST

## 2023-06-19 PROCEDURE — 71000033 HC RECOVERY, INTIAL HOUR: Performed by: SURGERY

## 2023-06-19 PROCEDURE — 83735 ASSAY OF MAGNESIUM: CPT

## 2023-06-19 PROCEDURE — 97116 GAIT TRAINING THERAPY: CPT

## 2023-06-19 PROCEDURE — 25000003 PHARM REV CODE 250: Performed by: NURSE ANESTHETIST, CERTIFIED REGISTERED

## 2023-06-19 PROCEDURE — 36000708 HC OR TIME LEV III 1ST 15 MIN: Performed by: SURGERY

## 2023-06-19 PROCEDURE — 25000003 PHARM REV CODE 250

## 2023-06-19 PROCEDURE — 25000003 PHARM REV CODE 250: Performed by: STUDENT IN AN ORGANIZED HEALTH CARE EDUCATION/TRAINING PROGRAM

## 2023-06-19 PROCEDURE — 85025 COMPLETE CBC W/AUTO DIFF WBC: CPT

## 2023-06-19 PROCEDURE — 84100 ASSAY OF PHOSPHORUS: CPT | Mod: 91

## 2023-06-19 PROCEDURE — D9220A PRA ANESTHESIA: Mod: ,,, | Performed by: ANESTHESIOLOGY

## 2023-06-19 PROCEDURE — 63600175 PHARM REV CODE 636 W HCPCS

## 2023-06-19 PROCEDURE — 88304 TISSUE EXAM BY PATHOLOGIST: CPT | Mod: TC | Performed by: SURGERY

## 2023-06-19 PROCEDURE — 36000709 HC OR TIME LEV III EA ADD 15 MIN: Performed by: SURGERY

## 2023-06-19 PROCEDURE — 27201423 OPTIME MED/SURG SUP & DEVICES STERILE SUPPLY: Performed by: SURGERY

## 2023-06-19 PROCEDURE — 63600175 PHARM REV CODE 636 W HCPCS: Performed by: ANESTHESIOLOGY

## 2023-06-19 PROCEDURE — 37000008 HC ANESTHESIA 1ST 15 MINUTES: Performed by: SURGERY

## 2023-06-19 PROCEDURE — D9220A PRA ANESTHESIA: ICD-10-PCS | Mod: ,,, | Performed by: ANESTHESIOLOGY

## 2023-06-19 PROCEDURE — 63600175 PHARM REV CODE 636 W HCPCS: Performed by: NURSE ANESTHETIST, CERTIFIED REGISTERED

## 2023-06-19 PROCEDURE — 80053 COMPREHEN METABOLIC PANEL: CPT | Mod: 91

## 2023-06-19 PROCEDURE — 96372 THER/PROPH/DIAG INJ SC/IM: CPT

## 2023-06-19 PROCEDURE — 82962 GLUCOSE BLOOD TEST: CPT | Performed by: SURGERY

## 2023-06-19 PROCEDURE — 85025 COMPLETE CBC W/AUTO DIFF WBC: CPT | Mod: 91

## 2023-06-19 PROCEDURE — 84100 ASSAY OF PHOSPHORUS: CPT

## 2023-06-19 PROCEDURE — 25000003 PHARM REV CODE 250: Performed by: SURGERY

## 2023-06-19 PROCEDURE — 21400001 HC TELEMETRY ROOM

## 2023-06-19 PROCEDURE — 37000009 HC ANESTHESIA EA ADD 15 MINS: Performed by: SURGERY

## 2023-06-19 PROCEDURE — 83735 ASSAY OF MAGNESIUM: CPT | Mod: 91

## 2023-06-19 PROCEDURE — 80053 COMPREHEN METABOLIC PANEL: CPT

## 2023-06-19 RX ORDER — SODIUM CHLORIDE, SODIUM LACTATE, POTASSIUM CHLORIDE, CALCIUM CHLORIDE 600; 310; 30; 20 MG/100ML; MG/100ML; MG/100ML; MG/100ML
1000 INJECTION, SOLUTION INTRAVENOUS CONTINUOUS
Status: DISCONTINUED | OUTPATIENT
Start: 2023-06-19 | End: 2023-06-20

## 2023-06-19 RX ORDER — LIDOCAINE HYDROCHLORIDE 20 MG/ML
INJECTION INTRAVENOUS
Status: DISCONTINUED | OUTPATIENT
Start: 2023-06-19 | End: 2023-06-19

## 2023-06-19 RX ORDER — KETOROLAC TROMETHAMINE 30 MG/ML
INJECTION, SOLUTION INTRAMUSCULAR; INTRAVENOUS
Status: DISCONTINUED | OUTPATIENT
Start: 2023-06-19 | End: 2023-06-19

## 2023-06-19 RX ORDER — ONDANSETRON 2 MG/ML
4 INJECTION INTRAMUSCULAR; INTRAVENOUS DAILY PRN
Status: DISCONTINUED | OUTPATIENT
Start: 2023-06-19 | End: 2023-06-19

## 2023-06-19 RX ORDER — ONDANSETRON 2 MG/ML
INJECTION INTRAMUSCULAR; INTRAVENOUS
Status: DISCONTINUED | OUTPATIENT
Start: 2023-06-19 | End: 2023-06-19

## 2023-06-19 RX ORDER — HYDROMORPHONE HYDROCHLORIDE 1 MG/ML
INJECTION, SOLUTION INTRAMUSCULAR; INTRAVENOUS; SUBCUTANEOUS
Status: DISPENSED
Start: 2023-06-19 | End: 2023-06-19

## 2023-06-19 RX ORDER — SODIUM CHLORIDE, SODIUM LACTATE, POTASSIUM CHLORIDE, CALCIUM CHLORIDE 600; 310; 30; 20 MG/100ML; MG/100ML; MG/100ML; MG/100ML
INJECTION, SOLUTION INTRAVENOUS CONTINUOUS
Status: DISCONTINUED | OUTPATIENT
Start: 2023-06-19 | End: 2023-06-19

## 2023-06-19 RX ORDER — POTASSIUM CHLORIDE 7.45 MG/ML
10 INJECTION INTRAVENOUS
Status: COMPLETED | OUTPATIENT
Start: 2023-06-19 | End: 2023-06-19

## 2023-06-19 RX ORDER — OXYCODONE HYDROCHLORIDE 5 MG/1
10 TABLET ORAL EVERY 6 HOURS PRN
Status: DISCONTINUED | OUTPATIENT
Start: 2023-06-19 | End: 2023-06-20 | Stop reason: HOSPADM

## 2023-06-19 RX ORDER — BUPIVACAINE HYDROCHLORIDE 2.5 MG/ML
INJECTION, SOLUTION EPIDURAL; INFILTRATION; INTRACAUDAL
Status: DISCONTINUED | OUTPATIENT
Start: 2023-06-19 | End: 2023-06-19 | Stop reason: HOSPADM

## 2023-06-19 RX ORDER — OXYCODONE HYDROCHLORIDE 5 MG/1
5 TABLET ORAL EVERY 6 HOURS PRN
Status: DISCONTINUED | OUTPATIENT
Start: 2023-06-19 | End: 2023-06-20 | Stop reason: HOSPADM

## 2023-06-19 RX ORDER — MORPHINE SULFATE 2 MG/ML
2 INJECTION, SOLUTION INTRAMUSCULAR; INTRAVENOUS EVERY 5 MIN PRN
Status: DISCONTINUED | OUTPATIENT
Start: 2023-06-19 | End: 2023-06-19

## 2023-06-19 RX ORDER — METHOCARBAMOL 500 MG/1
500 TABLET, FILM COATED ORAL 4 TIMES DAILY
Status: DISCONTINUED | OUTPATIENT
Start: 2023-06-19 | End: 2023-06-20 | Stop reason: HOSPADM

## 2023-06-19 RX ORDER — MORPHINE SULFATE 2 MG/ML
INJECTION, SOLUTION INTRAMUSCULAR; INTRAVENOUS
Status: DISPENSED
Start: 2023-06-19 | End: 2023-06-19

## 2023-06-19 RX ORDER — ROCURONIUM BROMIDE 10 MG/ML
INJECTION, SOLUTION INTRAVENOUS
Status: DISCONTINUED | OUTPATIENT
Start: 2023-06-19 | End: 2023-06-19

## 2023-06-19 RX ORDER — MAGNESIUM SULFATE HEPTAHYDRATE 40 MG/ML
2 INJECTION, SOLUTION INTRAVENOUS ONCE
Status: COMPLETED | OUTPATIENT
Start: 2023-06-19 | End: 2023-06-19

## 2023-06-19 RX ORDER — BUPIVACAINE HYDROCHLORIDE 2.5 MG/ML
INJECTION, SOLUTION EPIDURAL; INFILTRATION; INTRACAUDAL
Status: DISPENSED
Start: 2023-06-19 | End: 2023-06-19

## 2023-06-19 RX ORDER — INSULIN ASPART 100 [IU]/ML
2-9 INJECTION, SOLUTION INTRAVENOUS; SUBCUTANEOUS
Status: CANCELLED | OUTPATIENT
Start: 2023-06-19

## 2023-06-19 RX ORDER — CEFAZOLIN SODIUM 1 G/3ML
INJECTION, POWDER, FOR SOLUTION INTRAMUSCULAR; INTRAVENOUS
Status: DISCONTINUED | OUTPATIENT
Start: 2023-06-19 | End: 2023-06-19

## 2023-06-19 RX ORDER — INSULIN ASPART 100 [IU]/ML
4-12 INJECTION, SOLUTION INTRAVENOUS; SUBCUTANEOUS
Status: DISCONTINUED | OUTPATIENT
Start: 2023-06-19 | End: 2023-06-19

## 2023-06-19 RX ORDER — MORPHINE SULFATE 2 MG/ML
INJECTION, SOLUTION INTRAMUSCULAR; INTRAVENOUS
Status: COMPLETED
Start: 2023-06-19 | End: 2023-06-19

## 2023-06-19 RX ORDER — SODIUM CHLORIDE 0.9 % (FLUSH) 0.9 %
10 SYRINGE (ML) INJECTION
Status: DISCONTINUED | OUTPATIENT
Start: 2023-06-19 | End: 2023-06-20 | Stop reason: HOSPADM

## 2023-06-19 RX ORDER — GABAPENTIN 300 MG/1
300 CAPSULE ORAL 3 TIMES DAILY
Status: DISCONTINUED | OUTPATIENT
Start: 2023-06-19 | End: 2023-06-20 | Stop reason: HOSPADM

## 2023-06-19 RX ORDER — MIDAZOLAM HYDROCHLORIDE 1 MG/ML
INJECTION INTRAMUSCULAR; INTRAVENOUS
Status: DISPENSED
Start: 2023-06-19 | End: 2023-06-19

## 2023-06-19 RX ORDER — GLYCOPYRROLATE 0.2 MG/ML
INJECTION INTRAMUSCULAR; INTRAVENOUS
Status: DISCONTINUED | OUTPATIENT
Start: 2023-06-19 | End: 2023-06-19

## 2023-06-19 RX ORDER — POTASSIUM CHLORIDE 7.45 MG/ML
10 INJECTION INTRAVENOUS
Status: DISCONTINUED | OUTPATIENT
Start: 2023-06-19 | End: 2023-06-19

## 2023-06-19 RX ORDER — MIDAZOLAM HYDROCHLORIDE 1 MG/ML
2 INJECTION INTRAMUSCULAR; INTRAVENOUS ONCE AS NEEDED
Status: COMPLETED | OUTPATIENT
Start: 2023-06-19 | End: 2023-06-19

## 2023-06-19 RX ORDER — NEOSTIGMINE METHYLSULFATE 1 MG/ML
INJECTION, SOLUTION INTRAVENOUS
Status: DISCONTINUED | OUTPATIENT
Start: 2023-06-19 | End: 2023-06-19

## 2023-06-19 RX ORDER — MEPERIDINE HYDROCHLORIDE 25 MG/ML
12.5 INJECTION INTRAMUSCULAR; INTRAVENOUS; SUBCUTANEOUS EVERY 10 MIN PRN
Status: DISCONTINUED | OUTPATIENT
Start: 2023-06-19 | End: 2023-06-19

## 2023-06-19 RX ORDER — FENTANYL CITRATE 50 UG/ML
INJECTION, SOLUTION INTRAMUSCULAR; INTRAVENOUS
Status: DISCONTINUED | OUTPATIENT
Start: 2023-06-19 | End: 2023-06-19

## 2023-06-19 RX ORDER — PROPOFOL 10 MG/ML
VIAL (ML) INTRAVENOUS
Status: DISCONTINUED | OUTPATIENT
Start: 2023-06-19 | End: 2023-06-19

## 2023-06-19 RX ORDER — OXYCODONE HYDROCHLORIDE 5 MG/1
5 TABLET ORAL
Status: DISCONTINUED | OUTPATIENT
Start: 2023-06-19 | End: 2023-06-19

## 2023-06-19 RX ORDER — HYDROMORPHONE HYDROCHLORIDE 1 MG/ML
0.5 INJECTION, SOLUTION INTRAMUSCULAR; INTRAVENOUS; SUBCUTANEOUS EVERY 5 MIN PRN
Status: DISCONTINUED | OUTPATIENT
Start: 2023-06-19 | End: 2023-06-19

## 2023-06-19 RX ORDER — HYDROMORPHONE HYDROCHLORIDE 1 MG/ML
0.5 INJECTION, SOLUTION INTRAMUSCULAR; INTRAVENOUS; SUBCUTANEOUS
Status: DISCONTINUED | OUTPATIENT
Start: 2023-06-19 | End: 2023-06-20 | Stop reason: HOSPADM

## 2023-06-19 RX ADMIN — GABAPENTIN 300 MG: 300 CAPSULE ORAL at 03:06

## 2023-06-19 RX ADMIN — MIDAZOLAM HYDROCHLORIDE 2 MG: 1 INJECTION, SOLUTION INTRAMUSCULAR; INTRAVENOUS at 09:06

## 2023-06-19 RX ADMIN — SODIUM CHLORIDE, POTASSIUM CHLORIDE, SODIUM LACTATE AND CALCIUM CHLORIDE: 600; 310; 30; 20 INJECTION, SOLUTION INTRAVENOUS at 02:06

## 2023-06-19 RX ADMIN — KETOROLAC TROMETHAMINE 30 MG: 30 INJECTION, SOLUTION INTRAMUSCULAR at 10:06

## 2023-06-19 RX ADMIN — MORPHINE SULFATE 2 MG: 2 INJECTION, SOLUTION INTRAMUSCULAR; INTRAVENOUS at 11:06

## 2023-06-19 RX ADMIN — SODIUM CHLORIDE, POTASSIUM CHLORIDE, SODIUM LACTATE AND CALCIUM CHLORIDE: 600; 310; 30; 20 INJECTION, SOLUTION INTRAVENOUS at 09:06

## 2023-06-19 RX ADMIN — HYDROMORPHONE HYDROCHLORIDE 0.5 MG: 1 INJECTION, SOLUTION INTRAMUSCULAR; INTRAVENOUS; SUBCUTANEOUS at 11:06

## 2023-06-19 RX ADMIN — SODIUM CHLORIDE, POTASSIUM CHLORIDE, SODIUM LACTATE AND CALCIUM CHLORIDE: 600; 310; 30; 20 INJECTION, SOLUTION INTRAVENOUS at 06:06

## 2023-06-19 RX ADMIN — INSULIN ASPART 4 UNITS: 100 INJECTION, SOLUTION INTRAVENOUS; SUBCUTANEOUS at 09:06

## 2023-06-19 RX ADMIN — OXYCODONE HYDROCHLORIDE 10 MG: 5 TABLET ORAL at 01:06

## 2023-06-19 RX ADMIN — FENTANYL CITRATE 25 MCG: 50 INJECTION INTRAMUSCULAR; INTRAVENOUS at 10:06

## 2023-06-19 RX ADMIN — POTASSIUM CHLORIDE 10 MEQ: 7.46 INJECTION, SOLUTION INTRAVENOUS at 07:06

## 2023-06-19 RX ADMIN — ROCURONIUM BROMIDE 50 MG: 10 INJECTION INTRAVENOUS at 09:06

## 2023-06-19 RX ADMIN — ONDANSETRON 4 MG: 2 INJECTION INTRAMUSCULAR; INTRAVENOUS at 10:06

## 2023-06-19 RX ADMIN — CEFAZOLIN 2 G: 330 INJECTION, POWDER, FOR SOLUTION INTRAMUSCULAR; INTRAVENOUS at 09:06

## 2023-06-19 RX ADMIN — INSULIN DETEMIR 25 UNITS: 100 INJECTION, SOLUTION SUBCUTANEOUS at 09:06

## 2023-06-19 RX ADMIN — POTASSIUM CHLORIDE 10 MEQ: 7.46 INJECTION, SOLUTION INTRAVENOUS at 12:06

## 2023-06-19 RX ADMIN — OXYCODONE HYDROCHLORIDE 10 MG: 5 TABLET ORAL at 09:06

## 2023-06-19 RX ADMIN — LIDOCAINE HYDROCHLORIDE 60 MG: 20 INJECTION INTRAVENOUS at 09:06

## 2023-06-19 RX ADMIN — GABAPENTIN 300 MG: 300 CAPSULE ORAL at 09:06

## 2023-06-19 RX ADMIN — GLYCOPYRROLATE 0.8 MG: 1 INJECTION INTRAMUSCULAR; INTRAVENOUS at 10:06

## 2023-06-19 RX ADMIN — MAGNESIUM SULFATE HEPTAHYDRATE 2 G: 40 INJECTION, SOLUTION INTRAVENOUS at 07:06

## 2023-06-19 RX ADMIN — INSULIN ASPART 2 UNITS: 100 INJECTION, SOLUTION INTRAVENOUS; SUBCUTANEOUS at 06:06

## 2023-06-19 RX ADMIN — FENTANYL CITRATE 50 MCG: 50 INJECTION INTRAMUSCULAR; INTRAVENOUS at 09:06

## 2023-06-19 RX ADMIN — SODIUM CHLORIDE, POTASSIUM CHLORIDE, SODIUM LACTATE AND CALCIUM CHLORIDE: 600; 310; 30; 20 INJECTION, SOLUTION INTRAVENOUS at 07:06

## 2023-06-19 RX ADMIN — METHOCARBAMOL 500 MG: 500 TABLET ORAL at 09:06

## 2023-06-19 RX ADMIN — POTASSIUM CHLORIDE 10 MEQ: 7.46 INJECTION, SOLUTION INTRAVENOUS at 03:06

## 2023-06-19 RX ADMIN — PROPOFOL 150 MG: 10 INJECTION, EMULSION INTRAVENOUS at 09:06

## 2023-06-19 RX ADMIN — POTASSIUM CHLORIDE 10 MEQ: 7.46 INJECTION, SOLUTION INTRAVENOUS at 04:06

## 2023-06-19 RX ADMIN — HYDROMORPHONE HYDROCHLORIDE 0.5 MG: 0.5 INJECTION, SOLUTION INTRAMUSCULAR; INTRAVENOUS; SUBCUTANEOUS at 07:06

## 2023-06-19 RX ADMIN — ACETAMINOPHEN 650 MG: 325 TABLET ORAL at 12:06

## 2023-06-19 RX ADMIN — INSULIN ASPART 2 UNITS: 100 INJECTION, SOLUTION INTRAVENOUS; SUBCUTANEOUS at 02:06

## 2023-06-19 RX ADMIN — NEOSTIGMINE METHYLSULFATE 5 MG: 1 INJECTION INTRAVENOUS at 10:06

## 2023-06-19 RX ADMIN — METHOCARBAMOL 500 MG: 500 TABLET ORAL at 04:06

## 2023-06-19 RX ADMIN — ENOXAPARIN SODIUM 40 MG: 40 INJECTION SUBCUTANEOUS at 04:06

## 2023-06-19 NOTE — PT/OT/SLP PROGRESS
Physical Therapy Treatment    Patient Name:  Mary Gordillo   MRN:  [id number]    Recommendations     Discharge Recommendations:  home   Discharge Equipment Recommendations: none   Barriers to discharge: severity of deficits and decreased endurance    Assessment     Mary Gordillo is a 62 y.o. female admitted with a medical diagnosis of     Acute pancreatitis   T2DM  HTN  HLD  Hypertriglyceridemia   Syphilis   Transaminitis   Hypokalemia   Hypomagnesemia   There is no problem list on file for this patient.    She presents with the following impairments/functional limitations:  impaired functional mobility, gait instability, impaired balance, impaired cardiopulmonary response to activity.    Rehab Prognosis: Good.    Patient would benefit from continued skilled acute PT services to: address above listed impairments/functional limitations; receive patient/caregiver education; reduce fall risk; and maximize independency/safety with functional mobility.    Recent Surgery: Procedure(s) (LRB):  CHOLECYSTECTOMY, LAPAROSCOPIC (N/A) Day of Surgery    Plan     During this hospitalization, patient to be seen 3 x/week to address the identified impairments/functional limitations via gait training, therapeutic activities, therapeutic exercises and progress toward the established goals.    Plan of Care Expires:  07/14/23    Subjective     Communicated with patient's nurse Handley prior to session.    Patient agreeable to participate in treatment session.    Chief Complaint: none  Patient/Family Comments/goals: home  Pain/Comfort:  Pain Rating 1: 0/10  Pain Addressed 1: Nurse notified  Pain Rating Post-Intervention 1: 0/10    Objective      Services - [id number] - Jaquelin - [place]ian    Patient found supine in bed, with HOB elevated, and bed rails up bilateral HOB and w/ patients daughter and grand child in room  with peripheral IV, telemetry  upon PT entry to room.    General Precautions: Standard,  (  services required - [place] Zealander)   Orthopedic Precautions:N/A   Braces: N/A  Respiratory Status: room air    Functional Mobility:    Bed Mobility:  Rolling Right: independence  Scooting: independence  Supine to Sit: independence  with no cues required    Transfers:  Sit to Stand: modified independence with no assistive device  with no cues required    Gait:  Patient ambulated 200ft  Sitting rest x3 minutes  Patient ambulated 300ft  Sitting rest x3 minutes  Patient ambulated 260ft  With no assistive device and modified independence-supervision.  Patient demonstrates steady gait, upright posture, no loss of balance, no mis-steps, and decreased arm swing.    Other Mobility:  not assessed    Balance:  Sit  Patient demonstrated static balance on level surface with independence with no verbal cues.  Patient demonstrated dynamic balance on level surface with independence with no verbal cues during maximal excursions.  Stand  Patient demonstrated static balance on level surface  using no device with modified independence with no verbal cues.    Patient left sitting in chair with all lines intact, call button in reach, tray table at bedside, w/ patients daughter and grand child in room  with peripheral IV, telemetry and pt's nurse Emmanuelle notified.    Goals     Multidisciplinary Problems       Physical Therapy Goals          Problem: Physical Therapy    Goal Priority Disciplines Outcome Goal Variances Interventions   Physical Therapy Goal     PT, PT/OT Ongoing, Progressing     Description: Goals to be met by: DISCHARGE    Patient will increase functional independence with mobility by performin. Supine to sit with Modified [place] - MET 2023  2. Sit to supine with Modified [place] - ONGOING  3. Sit to stand transfer with Modified [place] - MET 2023  4. Gait  x 300 feet with Modified [place] using No Assistive Device - ONGOING                     Time Tracking     PT Received On: 23  PT  Start Time: 0814     PT Stop Time: 0837  PT Total Time (min): 23 min     Billable Minutes: Gait Training 23 06/19/2023

## 2023-06-19 NOTE — PROGRESS NOTES
[facility] Medicine Wards   Progress Note     Resident Team: [facility] Medicine List 3  Attending Physician: Claudia Schrader MD  Resident: Rony Mccray   Intern: Dian Burt      Date of Admit: 6/16/2023    Chief Complaint:     Hyperglycemia (Pt c/o chest pain and hyperglycemia, symptoms began approx 30 min prior to arrival. Pt took cbg at home and reports it read >500. Pt received 25 units of her insulin and drank water and pt's cbg 326 @ 0231 in triage. )    Subjective:      History of Present Illness:  Mary Gordillo is a 62 y.o. female with a history of T2DM and HLD who presented to [facility] ED on 6/16/2023  with complaint of diffuse abdominal pain.  Patient states she began to experience sudden onset epigastric pain and substernal chest pain overnight starting around 11pm.  She states she experienced a similar episode of pain approxx 1 week ago which resolved on its own after a few hours after drinking water.  This new episode has been constant over the last several hours and has been associated with 1 episode of N/V and shortness of breath.  Patient states she checked her blood sugar this morning and it was elevated >300.  Patient is on insulin therapy at home and was previously on metformin which was discontinued 2 months ago by PCP.  She denies any tobacco, alcohol, or recreational drug use.  She has no hx of abdominal surgeries but does endorse 1 episode of kidney dysfunction aproxx 1 year ago where she states she had to have a ureteral stent placed.   Patient denies any hematemesis or hematochezia and had her last bowel movement approx 1 day ago.  She denies any F/C, diarrhea, constipation, melena, abdominal distension, back pain, LE swelling, palpitations, or productive cough.  She is endorsing some vaginal bleeding starting a few days ago.  She states her last menstrual cycle was 10 years ago but does endorse an episode of chlamydia approxx 1 year ago.      In the ED, patient was initially found to be  hypertensive with BP of 195/104 and tachypnic at 24.  Other vitals were stable, satting well on room air.  Troponin and BNP performed in setting of chest pain which were both wnl.  EKG showed LBBB but no other major abnormalities noted.  Chest x-ray showed no acute processes.  CT AP performed showing findings concerning for pancreatitis and cholecystitis.  Abdominal US was performed showing gallstones but no acute processes or bile duct dilation.  Lipase was found to be >3000 and blood sugar was elevated at 348 along with AST/ALT at 152/145.  Internal medicine was consulted secondary to acute pancreatitis.      Interval History: NAEON.  Endorsing diffuse abdominal pain.  Denies any f/C, N/V, diarrhea, chest pain, sob, dysuria.   Chlamydia/gonhorrea test negative. Surgery today, will continue to monitor.    Past Medical History:   has a past medical history of Essential (primary) hypertension and Type 2 diabetes mellitus with unspecified diabetic retinopathy without macular edema.     Past Surgical History:   has no past surgical history on file.     Family History:  family history is not on file.     Social History:   reports that she has never smoked. She has never used smokeless tobacco. She reports that she does not use drugs.     Allergies:  has No Known Allergies.     Home Medications:  Prior to Admission medications    Medication Sig Start Date End Date Taking? Authorizing Provider   insulin NPH-insulin regular, 70/30, 100 unit/mL (70-30) injection Inject 20 Units into the skin 2 (two) times daily.   Yes Historical Provider     ROS is negative unless stated above      Objective:     Vital Signs (Most Recent):  Temp: 98.7 °F (37.1 °C) (06/19/23 0418)  Pulse: 71 (06/19/23 0418)  Resp: 18 (06/18/23 2354)  BP: (!) 149/81 (06/19/23 0418)  SpO2: 99 % (06/19/23 0418) Vital Signs (24h Range):  Temp:  [98.1 °F (36.7 °C)-99.1 °F (37.3 °C)] 98.7 °F (37.1 °C)  Pulse:  [71-85] 71  Resp:  [18-20] 18  SpO2:  [96 %-99 %] 99  %  BP: (125-153)/(75-84) 149/81     Physical Examination:  General: Patient resting in bed, in no acute distress  Eye: PERRLA, EOMI, clear conjunctiva, eyelids normal  HENT: Head-normocephalic and atraumatic  Neck: full range of motion, no thyromegaly or lymphadenopathy, trachea midline, supple, no palpable thyroid nodules  Respiratory: clear to auscultation bilaterally without wheezes, rales, rhonchi  Cardiovascular: regular rate and rhythm without murmurs.  No gallops or rubs no JVD.  Capillary refill within normal limits.  Gastrointestinal: mild tenderness to palpation more notable in the epigastric region.  Soft, non-distended, hypoactive bowel sounds.    Genitourinary: no CVA tenderness to palpation  Musculoskeletal: extremities without deficits.  Integumentary: no rashes or skin lesions present  Neurologic: no signs of peripheral neurological deficit, motor/sensory function intact  Psychiatric:  alert and oriented, cognitive function intact, cooperative with exam, good eye contact, judgement and insight intact, mood and affect full range.     Laboratory:  Most Recent Data:  Recent Lab Results  (Last 5 results in the past 24 hours)        06/19/23  0614   06/19/23  0337   06/19/23  0336   06/19/23  0035   06/18/23  1932        Albumin/Globulin Ratio   0.9     0.8         Albumin   2.9     2.9         Alkaline Phosphatase   148     155         ALT   72     75         AST   16     17         Baso #     0.01   0.01         Basophil %     0.2   0.2         BILIRUBIN TOTAL   0.6     0.5         BUN   7.3     8.8         Calcium   8.8     8.6         Chloride   108     104         CO2   22     24         Creatinine   0.71     0.86         eGFR   >60     >60         Eos #     0.15   0.11         Eosinophil %     2.7   1.7         Globulin, Total   3.4     3.5         Glucose   284     347         Hematocrit     32.6   32.0         Hemoglobin     11.1   10.9         Immature Grans (Abs)     0.02   0.03         Immature  Granulocytes     0.4   0.5         Lymph #     1.78   1.95         LYMPH %     31.5   30.1         Magnesium   1.70     1.70         MCH     29.8   30.1         MCHC     34.0   34.1         MCV     87.6   88.4         Mono #     0.41   0.47         Mono %     7.3   7.3         MPV     9.8   9.8         Neut #     3.28   3.90         Neut %     57.9   60.2         nRBC     0.0   0.0         Phosphorus   3.0     2.5         Platelets     181   179         POCT Glucose 232         325       Potassium   3.2     3.2         PROTEIN TOTAL   6.3     6.4         RBC     3.72   3.62         RDW     12.8   12.7         Sodium   139     136         WBC     5.65   6.47                              Radiology:  Imaging Results              US Abdomen Limited (Final result)  Result time 06/16/23 09:12:53      Final result by Stephane Ashley MD (06/16/23 09:12:53)                   Impression:      1. Cholelithiasis with no definitive sonographic findings for cholecystitis.  No biliary dilatation.  2. Mild hepatic steatosis with liver size upper normal.      Electronically signed by: Stephane Ashley  Date:    06/16/2023  Time:    09:12               Narrative:    EXAMINATION:  US ABDOMEN LIMITED    CLINICAL HISTORY:  distended gallbladder, stone;    TECHNIQUE:  Gray-scale and color Doppler ultrasound images of the abdomen.    COMPARISON:  CT 06/16/2023    FINDINGS:  Liver size upper normal with increased overall hepatic echogenicity.  Main portal vein patent with normal flow direction.    Pancreas partially obscured with slightly heterogeneous overall pancreatic echogenicity.  No significant ascites.  Normal caliber of the superior IVC.    Two gallstones identified.  Largest measures 19 mm.  No gallbladder wall thickening or biliary dilatation.    No hydronephrosis or defined calcification in the right kidney.    Measurements:    - Liver: 16.6 cm    - CBD diameter: 5 mm    - Right kidney: 10.1 cm in length                                        CT Abdomen Pelvis With Contrast (Final result)  Result time 06/16/23 07:30:59      Final result by Ryanne George MD (06/16/23 07:30:59)                   Impression:      1. Small amount of fluid centered around the pancreas, suggestive of acute pancreatitis.  No drainable fluid collection.  2. Cholelithiasis with distended gallbladder.      Electronically signed by: Ryanne George  Date:    06/16/2023  Time:    07:30               Narrative:    EXAMINATION:  CT ABDOMEN PELVIS WITH CONTRAST    CLINICAL HISTORY:  Epigastric pain;    TECHNIQUE:  CT imaging was performed of the abdomen and pelvis after the administration of intravenous contrast. Dose length product is 311 mGycm. Automatic exposure control, adjustment of mA/kV or iterative reconstruction technique was used to limit radiation dose.    COMPARISON:  None    FINDINGS:  Liver: Unremarkable.    Gallbladder and biliary tree: Large gallstone is noted.  The gallbladder is distended.  No intra or extrahepatic biliary ductal dilation.    Pancreas: The pancreas enhances normally.    Spleen: Normal.    Adrenals: Normal.    Kidneys and ureters: No hydronephrosis.    Bladder: Normal.    Reproductive organs: No pelvic masses.    Stomach/bowel: No evidence of bowel obstruction. Appendix is normal. No discernible bowel inflammation.    Lymph nodes: No pathologically enlarged lymph node identified.    Peritoneum: There is a small amount of fluid surrounding the pancreas, duodenum and central mesentery    Vessels: No abdominal aortic aneurysm.    Abdominal wall: Normal.    Lung bases: No consolidation or pleural effusion.    Bones: No acute osseous findings.                                       X-Ray Chest AP Portable (Final result)  Result time 06/16/23 06:26:55      Final result by Ryanne George MD (06/16/23 06:26:55)                   Impression:      No acute abnormality of the chest.      Electronically signed by: Ryanne  Ariel  Date:    06/16/2023  Time:    06:26               Narrative:    EXAMINATION:  XR CHEST AP PORTABLE    CLINICAL HISTORY:  chest pain;    COMPARISON:  None    FINDINGS:  The heart is normal in size.  The lungs are clear.  There is no pleural effusion or visible pneumothorax.                        Wet Read by Ender Pelaez DO (06/16/23 03:11:59, [facility], Emergency Medicine)    Normal cardiomediastinal silhouette.  No dense lobar consolidation or pneumothorax.                                       Lines/Drains/Airways       Peripheral Intravenous Line  Duration                  Peripheral IV - Single Lumen 06/16/23 0230 20 G Right Antecubital 3 days                     Assessment & Plan:     Acute pancreatitis  - Surgery following, OR today  -CT A/P performed showing small amount of fluid centered around the pancrease, suggestive of acute pancreatitis.  Also showed cholelithiasis with distended gallbladder   -Abdominal US performed showing cholelithiasis with no definitive sonographic findings for cholecystitis.  No biliary dilation.  Mild hepatic steatosis with liver size upper normal.   -given 2L NS in ED as well as 0.5mg Dilaudid   -patient experiencing substernal chest pain -- troponin, BNP, EKG wnl   -lipase >3000, , triglycerides 223, A1c 11.6, AST//145, ferritin 578.85  -continue LR at 250 cc/hr for continued hydration   -continue Dilaudid 1mg q6hrs PRN and morphine 2mg q4hrs PRN for pain management -- pain appears improved today   -NPO overnight    T2DM  -A1c 11.6  -home regimen includes NPH 70/30 20 units BID, holding for now  -Increased Levemir to 25 units nightly (from 20 units)   -continue LDSS and monitor CBG q4hrs     HTN  - Stable, will continue to optimize blood pressure s/p surgery today  - blood pressure 195/104 on initial presentation likely secondary to pain  -hydralazine and labetalol PRN, will continue to monitor.     HLD  Hypertriglyceridemia    -lipid panel performed showing total cholesterol of 197 w/ LDL cholesterol of 102 and triglycerides of 223  -ASCVD risk 5.9%, can consider addition of moderate-intensity statin     Syphilis   -syphilis antibody positive, RPR non-reactive  -patient denies prior treatment history, will contact health unit Monday to investigate treatment history  -patient endorsing vaginal bleeding and yellow discharge but is otherwise asymptomatic   -consider penicillin therapy while inpatient   -HIV negative, chlamydia/gonorrhea pending    Transaminitis   -AST/ALT elevated at 152/145 on presentation with alkaline phosphatase of 224 -- trending down   -likely secondary to acute pancreatitis and cholelithiasis   -hepatitis panel negative     Hypokalemia   Hypomagnesemia   -potassium of 3.2 on admission -- continuing to replete K and Mag as necessary  -will continue cardiac monitoring   -continue to monitor and replete as needed.     Code status: Full   DVT prophylaxis: Lovenox   Diet: NPO, advance as tolerated   Oxygenation: Room air   GI prophylaxis: none   Lines/drains: PIV  Consults: PT/OT     Disposition: Patient admitted for acute pancreatitis, will continue IVF and pain management.  NPO overnight. Surgery today.    Pierce Leon MD  [facility], HO-1

## 2023-06-19 NOTE — PROGRESS NOTES
"  [facility]  General Surgery - Gold Team  Progress Note  Admit Date: 6/16/2023  HD#0  POD#* No surgery date entered *    Subjective:   Interval history:  AFVSS  WBC wnl at 5.6  Tbili 0.6, AST/ALT 16/72  Pt has no complaints this am   NPO since mn in preparation for OR     Objective:     VITAL SIGNS: 24 HR MIN & MAX LAST   Temp  Min: 98.1 °F (36.7 °C)  Max: 99.1 °F (37.3 °C)  98.7 °F (37.1 °C)   BP  Min: 125/75  Max: 153/79  (!) 149/81    Pulse  Min: 71  Max: 85  71    Resp  Min: 18  Max: 20  18    SpO2  Min: 96 %  Max: 99 %  99 %      HT: 5' 5" (165.1 cm)  WT: 77.7 kg (171 lb 4.8 oz)  BMI: 28.5       Physical examination:  Gen: NAD, AAOx3, answering questions appropriately  HEENT:  CV: RR  Resp: NWOB  Abd: Soft, mildly TTP around epigastric region and RUQ, not distended.  Ext: moving all extremities spontaneously and purposefully  Neuro: CN II-XII grossly intact    Labs:  Recent Labs     06/18/23  0327 06/19/23  0035 06/19/23  0336 06/19/23  0337   WBC 6.04 6.47 5.65  --    HGB 10.9* 10.9* 11.1*  --    HCT 32.4* 32.0* 32.6*  --     179 181  --     136  --  139   K 2.9* 3.2*  --  3.2*   CO2 26 24  --  22*   BUN 4.0* 8.8*  --  7.3*   CREATININE 0.68 0.86  --  0.71   BILITOT 0.8 0.5  --  0.6   AST 31 17  --  16   * 75*  --  72*   ALKPHOS 151* 155*  --  148   CALCIUM 8.7 8.6  --  8.8   ALBUMIN 2.8* 2.9*  --  2.9*   MG 1.60 1.70  --  1.70   PHOS 2.3 2.5  --  3.0       Imaging:  US Abdomen Limited   Final Result      1. Cholelithiasis with no definitive sonographic findings for cholecystitis.  No biliary dilatation.   2. Mild hepatic steatosis with liver size upper normal.         Electronically signed by: Stephane Ashley   Date:    06/16/2023   Time:    09:12      CT Abdomen Pelvis With Contrast   Final Result      1. Small amount of fluid centered around the pancreas, suggestive of acute pancreatitis.  No drainable fluid collection.   2. Cholelithiasis with distended gallbladder. "         Electronically signed by: Ryanne George   Date:    06/16/2023   Time:    07:30      X-Ray Chest AP Portable   ED Interpretation   Normal cardiomediastinal silhouette.  No dense lobar consolidation or pneumothorax.      Final Result      No acute abnormality of the chest.         Electronically signed by: Ryanne George   Date:    06/16/2023   Time:    06:26         I have reviewed all pertinent imaging results/findings within the past 24 hours.      Assessment & Plan:   62-year-old female with HTN and DM who was admitted for gallstone pancreatitis. The patient's acute abdominal pain has improved and she is tolerating a regular diet.      - Plan for lap geno today  - Replaced K this morning in preparation for OR   - Maintain NPO until OR   - Remainder of plan of care per primary    Patricia Leach MD  [employer] General Surgery, PGY-1

## 2023-06-19 NOTE — OP NOTE
[facility]  Surgery Department  Operative Note    SUMMARY     Date of Procedure: 6/19/2023     Procedure: Procedure(s) (LRB):  CHOLECYSTECTOMY, LAPAROSCOPIC (N/A)     Surgeon(s) and Role:     * Tameka Mueller MD - Primary     * Penelope Roblero MD - Resident - Assisting        Pre-Operative Diagnosis: Gallstone pancreatitis    Post-Operative Diagnosis: Gallstone pancreatitis with acute cholecystitis     Anesthesia: General    Operative Findings (including complications, if any):   Gallstone pancreatitis w/acute cholecystitis     Description of Technical Procedures:   The patient was brought to the operating room, placed supine on the operating table, and general anesthesia was induced. Once adequately anesthetized, the abdomen was prepped and draped in the usual sterile fashion. A time out was performed, confirming the correct patient, procedure, and location and all team members were in agreement.     The abdomen was insufflated using a Veress needle at Crowe's point. Pneumoperitoneum was established. A supraumbilical incision was made and a 5 mm optical view trocar was placed. Camera was introduced into the abdomen and surveyed in all four quadrants. There was no sign of injury to intraabdominal structures or viscus from initial trocar placement. Additional trocars were introduced under direct vision. A 12 mm in epigastric and 2 additional 5mm ports in the right upper quadrant. The patient was positioned in the supine position with some reverse Trendelenburg and right side up. The gallbladder was identified, the fundus grasped and retracted cephalad and laterally. The infundibulum was grasped and retracted laterally, exposing the peritoneum overlying the triangle of Calot. This was then divided and exposed in a blunt fashion. The cystic duct was clearly identified and bluntly dissected circumferentially. The junctions of the gallbladder, cystic duct and common bile duct were  clearly identified prior to the division of any linear structure. The cystic duct was then doubly ligated with surgical clips on the patient side and singly clipped on the gallbladder side and divided. The cystic artery was identified, dissected free, ligated doubly with clips on the patient side and with a single clip on the gallbladder side and divided as well. The gallbladder was dissected from the liver bed in retrograde fashion with the monopolar hook electrocautery. The gallbladder was removed. The liver bed was irrigated and inspected. Hemostasis was achieved with the electrocautery. Copious irrigation was utilized and was repeatedly aspirated until clear all particulate matter. Pneumoperitoneum was completely reduced after viewing removal of the trocars under direct vision. The wound was thoroughly irrigated and the fascia was then closed with a figure of eight suture; the skin was then closed with 4-0 and dermabond was applied. Instrument, sponge, and needle counts were correct at closure and at the conclusion of the case.       Estimated Blood Loss (EBL): 25 mL          Specimens:   Specimen (24h ago, onward)       Start     Ordered    06/19/23 1041  Specimen to Pathology  RELEASE UPON ORDERING        References:    Click here for ordering Quick Tip   Question:  Release to patient  Answer:  Immediate    06/19/23 1041                            Condition: Stable    Disposition: PACU - hemodynamically stable.    Marley Yang MD

## 2023-06-19 NOTE — PROGRESS NOTES
06/19/23 1254   Missed Time Reason   OT Attempted Eval Date 06/19/23   OT Attempted Eval Time 0900   Missed Treatment Reason Off the floor for procedure/surgery     Pt undergoing lap geno; OT will need new orders to evaluate s/p surgical procedure.

## 2023-06-19 NOTE — ANESTHESIA PREPROCEDURE EVALUATION
06/19/2023  Mary Gordillo is a 62 y.o., female with HTN, DM presents for laparoscopic cholecystectomy.    NO BETA BLOCKER USE    Active Ambulatory Problems     Diagnosis Date Noted    No Active Ambulatory Problems     Resolved Ambulatory Problems     Diagnosis Date Noted    No Resolved Ambulatory Problems     Past Medical History:   Diagnosis Date    Essential (primary) hypertension     Type 2 diabetes mellitus with unspecified diabetic retinopathy without macular edema            Pre-op Assessment    I have reviewed the NPO Status.      Review of Systems  Anesthesia Hx:  No problems with previous Anesthesia    Social:  Non-Smoker    Cardiovascular:   Hypertension, poorly controlled    Pulmonary:  Pulmonary Normal    Renal/:  Renal/ Normal     Hepatic/GI:  Hepatic/GI Normal    Neurological:  Neurology Normal    Endocrine:   Diabetes, poorly controlled, type 2      Vitals:    06/18/23 2354 06/19/23 0418 06/19/23 0722 06/19/23 0909   BP: (!) 153/79 (!) 149/81 (!) 142/89 (!) 147/82   BP Location:   Left arm    Patient Position:   Lying    Pulse: 75 71 76 78   Resp: 18  19 20   Temp: 37 °C (98.6 °F) 37.1 °C (98.7 °F) 37.3 °C (99.1 °F) 36.3 °C (97.3 °F)   TempSrc: Oral Oral Oral Temporal   SpO2: 97% 99% 98% 100%   Weight:       Height:             Physical Exam  General: Alert, Cooperative and Well nourished    Airway:  Mallampati: II   Mouth Opening: Normal  TM Distance: Normal  Tongue: Normal  Neck ROM: Normal ROM    Dental:  Intact    Chest/Lungs:  Clear to auscultation, Normal Respiratory Rate    Heart:  Rate: Normal  Rhythm: Regular Rhythm  Sounds: Normal       Latest Reference Range & Units 06/19/23 06:14   POCT Glucose 70 - 110 mg/dL 232 (H)   (H): Data is abnormally high  Lab Results   Component Value Date    WBC 5.65 06/19/2023    HGB 11.1 (L) 06/19/2023    HCT 32.6 (L) 06/19/2023    MCV  87.6 06/19/2023     06/19/2023       CMP  Sodium Level   Date Value Ref Range Status   06/19/2023 139 136 - 145 mmol/L Final     Potassium Level   Date Value Ref Range Status   06/19/2023 3.2 (L) 3.5 - 5.1 mmol/L Final     Carbon Dioxide   Date Value Ref Range Status   06/19/2023 22 (L) 23 - 31 mmol/L Final     Blood Urea Nitrogen   Date Value Ref Range Status   06/19/2023 7.3 (L) 9.8 - 20.1 mg/dL Final     Creatinine   Date Value Ref Range Status   06/19/2023 0.71 0.55 - 1.02 mg/dL Final     Calcium Level Total   Date Value Ref Range Status   06/19/2023 8.8 8.4 - 10.2 mg/dL Final     Albumin Level   Date Value Ref Range Status   06/19/2023 2.9 (L) 3.4 - 4.8 g/dL Final     Bilirubin Total   Date Value Ref Range Status   06/19/2023 0.6 <=1.5 mg/dL Final     Alkaline Phosphatase   Date Value Ref Range Status   06/19/2023 148 40 - 150 unit/L Final     Aspartate Aminotransferase   Date Value Ref Range Status   06/19/2023 16 5 - 34 unit/L Final     Alanine Aminotransferase   Date Value Ref Range Status   06/19/2023 72 (H) 0 - 55 unit/L Final     eGFR   Date Value Ref Range Status   06/19/2023 >60 mls/min/1.73/m2 Final     Lab Results   Component Value Date    HGBA1C 11.6 (H) 06/16/2023           Anesthesia Plan  Type of Anesthesia, risks & benefits discussed:    Anesthesia Type: Gen ETT  Intra-op Monitoring Plan: Standard ASA Monitors  Post Op Pain Control Plan: IV/PO Opioids PRN  Induction:  IV  Airway Plan: Direct  Informed Consent: Informed consent signed with the Patient and all parties understand the risks and agree with anesthesia plan.  All questions answered.   ASA Score: 3  Day of Surgery Review of History & Physical: H&P Update referred to the surgeon/provider.    Ready For Surgery From Anesthesia Perspective.     .

## 2023-06-19 NOTE — PLAN OF CARE
Surgery Plan of Care Note:    Patient was seen and evaluated post operatively. Non-tachycardic, normotensive. Sating well on NC. Reports incisional pain as well as pain to IV site (Kcl infusing). No nausea or emesis. Oral narcotics and IV breakthrough pain medicine is ordered. Okay for diet today. May start with CLD and advance as tolerated to diabetic diet.    Marley Yang MD

## 2023-06-19 NOTE — TRANSFER OF CARE
Anesthesia Transfer of Care Note    Patient: Mary Gordillo    Procedure(s) Performed: Procedure(s) (LRB):  CHOLECYSTECTOMY, LAPAROSCOPIC (N/A)    Patient location: PACU    Anesthesia Type: general    Transport from OR: Transported from OR on room air with adequate spontaneous ventilation    Post pain: adequate analgesia    Post assessment: no apparent anesthetic complications and tolerated procedure well    Post vital signs: stable    Level of consciousness: sedated    Nausea/Vomiting: no nausea/vomiting    Complications: none    Transfer of care protocol was followed

## 2023-06-19 NOTE — ANESTHESIA POSTPROCEDURE EVALUATION
Anesthesia Post Evaluation    Patient: Mary Gordillo    Procedure(s) Performed: Procedure(s) (LRB):  CHOLECYSTECTOMY, LAPAROSCOPIC (N/A)    Final Anesthesia Type: general      Patient location during evaluation: PACU  Patient participation: Yes- Able to Participate  Level of consciousness: awake and responds to stimulation  Post-procedure vital signs: reviewed and stable  Pain management: adequate  Airway patency: patent    PONV status at discharge: No PONV  Anesthetic complications: no      Cardiovascular status: blood pressure returned to baseline  Respiratory status: unassisted  Hydration status: euvolemic  Follow-up not needed.          Vitals Value Taken Time   /84 06/19/23 1145   Temp 36.5 °C (97.7 °F) 06/19/23 1145   Pulse 71 06/19/23 1145   Resp 16 06/19/23 1325   SpO2 95 % 06/19/23 1321         Event Time   Out of Recovery 06/19/2023 11:38:00         Pain/Josue Score: Pain Rating Prior to Med Admin: 8 (6/19/2023  1:25 PM)  Pain Rating Post Med Admin: 5 (6/19/2023 11:58 AM)  Josue Score: 9 (6/19/2023 11:17 AM)

## 2023-06-19 NOTE — ANESTHESIA PROCEDURE NOTES
Intubation    Date/Time: 6/19/2023 9:34 AM  Performed by: Ally Smith CRNA  Authorized by: Eden Ayala MD     Intubation:     Induction:  Intravenous    Intubated:  Postinduction    Mask Ventilation:  Easy with oral airway    Attempts:  1    Attempted By:  CRNA    Method of Intubation:  Direct    Blade:  Ambrose 2    Laryngeal View Grade: Grade I - full view of cords      Difficult Airway Encountered?: No      Complications:  None    Airway Device:  Oral endotracheal tube    Airway Device Size:  7.5    Style/Cuff Inflation:  Cuffed (inflated to minimal occlusive pressure)    Inflation Amount (mL):  6    Tube secured:  21    Secured at:  The lips    Placement Verified By:  Capnometry    Complicating Factors:  None    Findings Post-Intubation:  BS equal bilateral and atraumatic/condition of teeth unchanged

## 2023-06-20 ENCOUNTER — DOCUMENTATION ONLY (OUTPATIENT)
Dept: PHARMACY | Facility: HOSPITAL | Age: 63
End: 2023-06-20

## 2023-06-20 VITALS
TEMPERATURE: 98 F | BODY MASS INDEX: 28.54 KG/M2 | SYSTOLIC BLOOD PRESSURE: 102 MMHG | HEIGHT: 65 IN | HEART RATE: 89 BPM | OXYGEN SATURATION: 96 % | DIASTOLIC BLOOD PRESSURE: 65 MMHG | RESPIRATION RATE: 21 BRPM | WEIGHT: 171.31 LBS

## 2023-06-20 PROBLEM — K85.10 GALLSTONE PANCREATITIS: Status: ACTIVE | Noted: 2023-06-20

## 2023-06-20 LAB
ALBUMIN SERPL-MCNC: 2.6 G/DL (ref 3.4–4.8)
ALBUMIN/GLOB SERPL: 0.8 RATIO (ref 1.1–2)
ALP SERPL-CCNC: 150 UNIT/L (ref 40–150)
ALT SERPL-CCNC: 70 UNIT/L (ref 0–55)
AST SERPL-CCNC: 39 UNIT/L (ref 5–34)
BASOPHILS # BLD AUTO: 0.01 X10(3)/MCL
BASOPHILS NFR BLD AUTO: 0.1 %
BILIRUBIN DIRECT+TOT PNL SERPL-MCNC: 0.8 MG/DL
BUN SERPL-MCNC: 5.5 MG/DL (ref 9.8–20.1)
CALCIUM SERPL-MCNC: 8.6 MG/DL (ref 8.4–10.2)
CHLORIDE SERPL-SCNC: 102 MMOL/L (ref 98–107)
CO2 SERPL-SCNC: 24 MMOL/L (ref 23–31)
CREAT SERPL-MCNC: 0.71 MG/DL (ref 0.55–1.02)
EOSINOPHIL # BLD AUTO: 0.06 X10(3)/MCL (ref 0–0.9)
EOSINOPHIL NFR BLD AUTO: 0.7 %
ERYTHROCYTE [DISTWIDTH] IN BLOOD BY AUTOMATED COUNT: 12.9 % (ref 11.5–17)
ESTROGEN SERPL-MCNC: NORMAL PG/ML
GFR SERPLBLD CREATININE-BSD FMLA CKD-EPI: >60 MLS/MIN/1.73/M2
GLOBULIN SER-MCNC: 3.3 GM/DL (ref 2.4–3.5)
GLUCOSE SERPL-MCNC: 275 MG/DL (ref 82–115)
HCT VFR BLD AUTO: 31.3 % (ref 37–47)
HGB BLD-MCNC: 10.1 G/DL (ref 12–16)
IMM GRANULOCYTES # BLD AUTO: 0.03 X10(3)/MCL (ref 0–0.04)
IMM GRANULOCYTES NFR BLD AUTO: 0.3 %
INSULIN SERPL-ACNC: NORMAL U[IU]/ML
LAB AP CLINICAL INFORMATION: NORMAL
LAB AP GROSS DESCRIPTION: NORMAL
LAB AP REPORT FOOTNOTES: NORMAL
LYMPHOCYTES # BLD AUTO: 1.05 X10(3)/MCL (ref 0.6–4.6)
LYMPHOCYTES NFR BLD AUTO: 12 %
MAGNESIUM SERPL-MCNC: 1.4 MG/DL (ref 1.6–2.6)
MCH RBC QN AUTO: 29.5 PG (ref 27–31)
MCHC RBC AUTO-ENTMCNC: 32.3 G/DL (ref 33–36)
MCV RBC AUTO: 91.5 FL (ref 80–94)
MONOCYTES # BLD AUTO: 0.49 X10(3)/MCL (ref 0.1–1.3)
MONOCYTES NFR BLD AUTO: 5.6 %
NEUTROPHILS # BLD AUTO: 7.1 X10(3)/MCL (ref 2.1–9.2)
NEUTROPHILS NFR BLD AUTO: 81.3 %
NRBC BLD AUTO-RTO: 0 %
PATH REV: NORMAL
PHOSPHATE SERPL-MCNC: 4 MG/DL (ref 2.3–4.7)
PLATELET # BLD AUTO: 185 X10(3)/MCL (ref 130–400)
PMV BLD AUTO: 9.9 FL (ref 7.4–10.4)
POCT GLUCOSE: 198 MG/DL (ref 70–110)
POCT GLUCOSE: 225 MG/DL (ref 70–110)
POCT GLUCOSE: 229 MG/DL (ref 70–110)
POCT GLUCOSE: 279 MG/DL (ref 70–110)
POTASSIUM SERPL-SCNC: 3.6 MMOL/L (ref 3.5–5.1)
PROT SERPL-MCNC: 5.9 GM/DL (ref 5.8–7.6)
RBC # BLD AUTO: 3.42 X10(6)/MCL (ref 4.2–5.4)
SODIUM SERPL-SCNC: 135 MMOL/L (ref 136–145)
T3RU NFR SERPL: NORMAL %
WBC # SPEC AUTO: 8.74 X10(3)/MCL (ref 4.5–11.5)

## 2023-06-20 PROCEDURE — 63600175 PHARM REV CODE 636 W HCPCS

## 2023-06-20 PROCEDURE — 25000003 PHARM REV CODE 250: Performed by: STUDENT IN AN ORGANIZED HEALTH CARE EDUCATION/TRAINING PROGRAM

## 2023-06-20 PROCEDURE — 80053 COMPREHEN METABOLIC PANEL: CPT

## 2023-06-20 PROCEDURE — 85025 COMPLETE CBC W/AUTO DIFF WBC: CPT

## 2023-06-20 PROCEDURE — 63600175 PHARM REV CODE 636 W HCPCS: Performed by: STUDENT IN AN ORGANIZED HEALTH CARE EDUCATION/TRAINING PROGRAM

## 2023-06-20 PROCEDURE — 63600175 PHARM REV CODE 636 W HCPCS: Performed by: ANESTHESIOLOGY

## 2023-06-20 PROCEDURE — 25000003 PHARM REV CODE 250

## 2023-06-20 PROCEDURE — 83735 ASSAY OF MAGNESIUM: CPT

## 2023-06-20 PROCEDURE — 84100 ASSAY OF PHOSPHORUS: CPT

## 2023-06-20 RX ORDER — POTASSIUM CHLORIDE 20 MEQ/1
40 TABLET, EXTENDED RELEASE ORAL ONCE
Status: COMPLETED | OUTPATIENT
Start: 2023-06-20 | End: 2023-06-20

## 2023-06-20 RX ORDER — MAGNESIUM SULFATE HEPTAHYDRATE 40 MG/ML
2 INJECTION, SOLUTION INTRAVENOUS ONCE
Status: DISCONTINUED | OUTPATIENT
Start: 2023-06-20 | End: 2023-06-20

## 2023-06-20 RX ORDER — INSULIN ASPART 100 [IU]/ML
6 INJECTION, SOLUTION INTRAVENOUS; SUBCUTANEOUS
Status: DISCONTINUED | OUTPATIENT
Start: 2023-06-20 | End: 2023-06-20 | Stop reason: HOSPADM

## 2023-06-20 RX ORDER — MAGNESIUM SULFATE HEPTAHYDRATE 40 MG/ML
2 INJECTION, SOLUTION INTRAVENOUS ONCE
Status: COMPLETED | OUTPATIENT
Start: 2023-06-20 | End: 2023-06-20

## 2023-06-20 RX ORDER — HYDROCODONE BITARTRATE AND ACETAMINOPHEN 5; 325 MG/1; MG/1
1 TABLET ORAL EVERY 8 HOURS PRN
Qty: 15 TABLET | Refills: 0 | Status: SHIPPED | OUTPATIENT
Start: 2023-06-20

## 2023-06-20 RX ORDER — INSULIN ASPART 100 [IU]/ML
6 INJECTION, SOLUTION INTRAVENOUS; SUBCUTANEOUS 3 TIMES DAILY
Qty: 10 ML | Refills: 11 | Status: SHIPPED | OUTPATIENT
Start: 2023-06-20 | End: 2024-06-19

## 2023-06-20 RX ADMIN — INSULIN ASPART 2 UNITS: 100 INJECTION, SOLUTION INTRAVENOUS; SUBCUTANEOUS at 01:06

## 2023-06-20 RX ADMIN — METHOCARBAMOL 500 MG: 500 TABLET ORAL at 10:06

## 2023-06-20 RX ADMIN — MAGNESIUM SULFATE HEPTAHYDRATE 2 G: 40 INJECTION, SOLUTION INTRAVENOUS at 07:06

## 2023-06-20 RX ADMIN — INSULIN ASPART 3 UNITS: 100 INJECTION, SOLUTION INTRAVENOUS; SUBCUTANEOUS at 07:06

## 2023-06-20 RX ADMIN — SODIUM CHLORIDE, POTASSIUM CHLORIDE, SODIUM LACTATE AND CALCIUM CHLORIDE 1000 ML: 600; 310; 30; 20 INJECTION, SOLUTION INTRAVENOUS at 12:06

## 2023-06-20 RX ADMIN — OXYCODONE HYDROCHLORIDE 10 MG: 5 TABLET ORAL at 05:06

## 2023-06-20 RX ADMIN — POTASSIUM CHLORIDE 40 MEQ: 1500 TABLET, EXTENDED RELEASE ORAL at 07:06

## 2023-06-20 RX ADMIN — INSULIN ASPART 6 UNITS: 100 INJECTION, SOLUTION INTRAVENOUS; SUBCUTANEOUS at 07:06

## 2023-06-20 RX ADMIN — HYDROMORPHONE HYDROCHLORIDE 0.5 MG: 0.5 INJECTION, SOLUTION INTRAMUSCULAR; INTRAVENOUS; SUBCUTANEOUS at 01:06

## 2023-06-20 RX ADMIN — DOCUSATE SODIUM 100 MG: 100 CAPSULE, LIQUID FILLED ORAL at 10:06

## 2023-06-20 RX ADMIN — POLYETHYLENE GLYCOL 3350 17 G: 17 POWDER, FOR SOLUTION ORAL at 10:06

## 2023-06-20 RX ADMIN — INSULIN ASPART 6 UNITS: 100 INJECTION, SOLUTION INTRAVENOUS; SUBCUTANEOUS at 11:06

## 2023-06-20 RX ADMIN — GABAPENTIN 300 MG: 300 CAPSULE ORAL at 10:06

## 2023-06-20 NOTE — DISCHARGE SUMMARY
[facility] Internal Medicine Discharge Summary    Admitting Physician: Claudia Schrader MD  Attending Physician: Claudia Schrader MD  Date of Admit: 6/16/2023  Date of Discharge: 6/20/2023    Condition: Good  Outcome: Patient tolerated treatment/procedure well without complication and is now ready for discharge.  DISPOSITION: Home or Self Care          Discharge Diagnoses     Patient Active Problem List   Diagnosis    Gallstone pancreatitis       Principal Problem:  Gallstone pancreatitis    Consultants and Procedures     Consultants:  IP CONSULT TO HOSPITAL MEDICINE  IP CONSULT TO GENERAL SURGERY    Procedures:   Procedure(s) (LRB):  CHOLECYSTECTOMY, LAPAROSCOPIC (N/A)     Brief Admission History      Mary Gordillo is a 62 y.o. female with a history of T2DM and HLD who presented to [facility] ED on 6/16/2023  with complaint of diffuse abdominal pain.  Patient states she began to experience sudden onset epigastric pain and substernal chest pain overnight starting around 11pm.  She states she experienced a similar episode of pain approxx 1 week ago which resolved on its own after a few hours after drinking water.  This new episode has been constant over the last several hours and has been associated with 1 episode of N/V and shortness of breath.  Patient states she checked her blood sugar this morning and it was elevated >300.  Patient is on insulin therapy at home and was previously on metformin which was discontinued 2 months ago by PCP.  She denies any tobacco, alcohol, or recreational drug use.  She has no hx of abdominal surgeries but does endorse 1 episode of kidney dysfunction aproxx 1 year ago where she states she had to have a ureteral stent placed.   Patient denies any hematemesis or hematochezia and had her last bowel movement approx 1 day ago.  She denies any F/C, diarrhea, constipation, melena, abdominal distension, back pain, LE swelling, palpitations, or productive cough.  She is endorsing some vaginal bleeding  "starting a few days ago.  She states her last menstrual cycle was 10 years ago but does endorse an episode of chlamydia approxx 1 year ago.       In the ED, patient was initially found to be hypertensive with BP of 195/104 and tachypnic at 24.  Other vitals were stable, satting well on room air.  Troponin and BNP performed in setting of chest pain which were both wnl.  EKG showed LBBB but no other major abnormalities noted.  Chest x-ray showed no acute processes.  CT AP performed showing findings concerning for pancreatitis and cholecystitis.  Abdominal US was performed showing gallstones but no acute processes or bile duct dilation.  Lipase was found to be >3000 and blood sugar was elevated at 348 along with AST/ALT at 152/145.  Internal medicine was consulted secondary to acute pancreatitis.      Hospital Course with Pertinent Findings     Patient admitted on 6/16/23 for acute pancreatitis 2/2 chololithiasis. Cholecystomy completed on 6/19/23 w/o complications. Patient'a diet advanced to clear liquid and then diabetic diet on 6/20/23 which she tolerated well. Attempts for uptitrating insulin regime were made with patient's regime being Novolog 30 units nightly and Aspart 6 units TIDWM at discharge. Patient scheduled for IM Post long clinic, IM clinic for establishment of PCP (and further titration of insulin regime), infectious disease clinic for positive syphilis antibody, negative RPR (w/ no treatment of syphilis in past) and Gynecology clinic for vaginal itching and AUB mentioned at time of discharge. Patient discharged with Norco 5-325 mg q8hr for breakthrough pain (15 tablets). Patient discharged on 6/20/23.    Discharge physical exam:  Vitals  BP: 102/65  Temp: 98.3 °F (36.8 °C)  Temp Source: Oral  Pulse: 89  Resp: (!) 21  SpO2: 96 %  Height: 5' 5" (165.1 cm)  Weight: 77.7 kg (171 lb 4.8 oz)    Physical Exam  Vitals and nursing note reviewed.   Constitutional:       Appearance: Normal appearance.   HENT:     "  Head: Normocephalic.   Eyes:      General: No scleral icterus.     Extraocular Movements: Extraocular movements intact.      Pupils: Pupils are equal, round, and reactive to light.   Cardiovascular:      Rate and Rhythm: Normal rate and regular rhythm.      Pulses: Normal pulses.      Heart sounds: Normal heart sounds. No murmur heard.  Pulmonary:      Effort: Pulmonary effort is normal. No respiratory distress.      Breath sounds: Normal breath sounds. No wheezing or rales.   Abdominal:      General: Abdomen is flat. Bowel sounds are normal. There is no distension.      Palpations: Abdomen is soft.      Tenderness: There is abdominal tenderness. There is no guarding.   Genitourinary:     Vagina: No vaginal discharge.   Musculoskeletal:         General: Normal range of motion.      Cervical back: Normal range of motion.      Right lower leg: No edema.      Left lower leg: No edema.   Skin:     General: Skin is warm and dry.      Capillary Refill: Capillary refill takes less than 2 seconds.      Coloration: Skin is not jaundiced or pale.      Findings: No lesion or rash.      Comments: Incision sites s/p laparoscopic cholecystectomy clean and intact, negative for erythema.   Neurological:      General: No focal deficit present.      Mental Status: She is alert and oriented to person, place, and time. Mental status is at baseline.         TIME SPENT ON DISCHARGE: 60 minutes    Discharge Medications        Medication List        START taking these medications      HYDROcodone-acetaminophen 5-325 mg per tablet  Commonly known as: NORCO  Take 1 tablet by mouth every 8 (eight) hours as needed for Pain (severe breakthrough pain).     insulin aspart U-100 100 unit/mL injection  Commonly known as: NovoLOG  Inject 6 Units into the skin 3 (three) times daily.     insulin detemir U-100 100 unit/mL injection  Commonly known as: Levemir  Inject 30 Units into the skin every evening.            STOP taking these medications       insulin NPH-insulin regular (70/30) 100 unit/mL (70-30) injection               Where to Get Your Medications        These medications were sent to [facility] [address]  [address] [place] [address]      Phone: [phone]   HYDROcodone-acetaminophen 5-325 mg per tablet  insulin aspart U-100 100 unit/mL injection  insulin detemir U-100 100 unit/mL injection         Discharge Information:   Mary Gordillo is being discharged .    Discharge Procedure Orders   Ambulatory referral/consult to Internal Medicine   Standing Status: Future   Referral Priority: Routine Referral Type: Consultation   Referral Reason: Specialty Services Required   Requested Specialty: Internal Medicine   Number of Visits Requested: 1     Ambulatory referral/consult to Gynecology   Standing Status: Future   Referral Priority: Routine Referral Type: Consultation   Referral Reason: Specialty Services Required   Requested Specialty: Gynecology   Number of Visits Requested: 1     Ambulatory referral/consult to Infectious Disease   Standing Status: Future   Referral Priority: Routine Referral Type: Consultation   Referral Reason: Specialty Services Required   Requested Specialty: Infectious Diseases   Number of Visits Requested: 1        Follow-Up Appointments:      To address at follow-up:  -The following labs are to be drawn at the [facility] visit: POCT Glucose  -The following imaging studies are to be ordered at the [facility] visit: None    The above information was discussed with the patient in clear terms. Patient was able to repeat the instructions to me in their own words. All questions answered. ED precautions provided.      Pierce Leon MD  [facility] Internal Medicine, HO-[facility]

## 2023-06-20 NOTE — PROGRESS NOTES
General Surgery  Daily Progress Note     HD#1  POD#1 Day Post-Op    SUBJECTIVE / INTERVAL EVENTS  Tmax 100.7 F  Vitals otherwise wnl  Sating well on NC  Pain controlled with oral medications  Tolerated CLD    OBJECTIVE    Vitals  Temp:  [97.1 °F (36.2 °C)-100.7 °F (38.2 °C)] 98.3 °F (36.8 °C)  Pulse:  [] 97  Resp:  [14-22] 20  SpO2:  [92 %-100 %] 98 %  BP: (111-153)/(63-89) 122/78    Intake / Output  UOP: x2 unmeasured     Physical Exam:  Resting in bed, in NAD  Sating well on NC  Abdomen soft, appropriately tender  Incisions w/out erythema, edema or drainage  Skin WWP       Labs    Lab Results   Component Value Date    WBC 8.74 06/20/2023    HGB 10.1 (L) 06/20/2023    HCT 31.3 (L) 06/20/2023    MCV 91.5 06/20/2023     06/20/2023           Recent Labs     06/18/23  0327 06/19/23  0035 06/19/23  0337 06/19/23  1358 06/20/23  0315    136 139 138 135*   K 2.9* 3.2* 3.2* 3.5 3.6   CO2 26 24 22* 25 24   BUN 4.0* 8.8* 7.3* 6.0* 5.5*   CREATININE 0.68 0.86 0.71 0.72 0.71   CALCIUM 8.7 8.6 8.8 8.9 8.6   MG 1.60 1.70 1.70  --  1.40*   PHOS 2.3 2.5 3.0  --  4.0   ALBUMIN 2.8* 2.9* 2.9*  --  2.6*   BILITOT 0.8 0.5 0.6  --  0.8   AST 31 17 16  --  39*   ALKPHOS 151* 155* 148  --  150   * 75* 72*  --  70*        Micro  Microbiology Results (last 7 days)       Procedure Component Value Units Date/Time    Chlamydia/GC, PCR [[id number]]  (Normal) Collected: 06/18/23 0756    Order Status: Completed Specimen: Urine Updated: 06/18/23 1038     Chlamydia trachomatis PCR Not Detected     N. gonorrhea PCR Not Detected    Narrative:      The Xpert CT/NG test, performed on the GeneXpert system is a qualitative in vitro real-time polymerase chain reaction (PCR) test for the automated detected and differentiation for genomic DNA from Chlamydia trachomatis (CT) and/or Neisseria gonorrhoeae (NG).             Imaging  No new imaging    ASSESSMENT & PLAN  62-year-old female with HTN and DM who was admitted for gallstone  pancreatitis who is s/p lap geno on 6/19  - Advance diet as tolerated  - MMPC  - Encourage OOB, IS use  - Please call surgery with any questions or concerns    Marley Yang MD  [facility] General Surgery PGY3    6/20/2023  6:43 AM

## 2023-06-20 NOTE — PLAN OF CARE
Problem: Adult Inpatient Plan of Care  Goal: Plan of Care Review  Outcome: Ongoing, Progressing  Goal: Patient-Specific Goal (Individualized)  Outcome: Ongoing, Progressing  Goal: Absence of Hospital-Acquired Illness or Injury  Outcome: Ongoing, Progressing  Goal: Optimal Comfort and Wellbeing  Outcome: Ongoing, Progressing  Goal: Readiness for Transition of Care  Outcome: Ongoing, Progressing      - - -

## 2023-06-20 NOTE — PROGRESS NOTES
[facility] filled norco, novolog, levemir and syringes and patient's son picked up in pharmacy.

## 2023-06-20 NOTE — PT/OT/SLP PROGRESS
Physical Therapy    Missed Treatment Session    Patient Name:  Mary Gordillo   MRN:  [id number]      -patient not seen at this time secondary to patient unwilling to participate  -patients son in room  - services utilized ([id number])  -per patient-already got up and walked  -therapist explained importance of mobility w/ therapy  -patient continued to verbalize that she has already walked and agreed to therapist returning tomorrow  -will follow-up as patient is available to participate and as therapists' schedule allows  -patients nurse Maya notified of treatment attempt outcome

## 2023-06-21 NOTE — PT/OT/SLP DISCHARGE
POST DISCHARGE DOCUMENTATION - 2023 10:17 AM    Physical Therapy Discharge Summary    Name: Mary Gordillo  MRN: [id number]   Principal Problem: Gallstone pancreatitis     s/p lap geno on   Acute pancreatitis   T2DM  HTN  HLD  Hypertriglyceridemia   Syphilis   Transaminitis   Hypokalemia   Hypomagnesemia   There is no problem list on file for this patient.  Recommendations - per last treatment session     Discharge Recommendations:  home   Discharge Equipment Recommendations: none     Assessment:     Refer to prior Physical Therapy note dated 2023 for last known functional status of patient.  (Patient declined RE-EVALUATION attempt (2023) following surgical procedure 2023)    Patient was unexpectedly discharged from hospital.  Refer to therapy's initial evaluation or last treatment note for patient's most recent functional status and goal achievement and therapists' recommendations.    Objective     GOALS: 2 out of 4 STG's met by patient    Multidisciplinary Problems       Physical Therapy Goals       Problem: Physical Therapy    Goal Priority Disciplines Outcome Goal Variances Interventions   Physical Therapy Goal     PT, PT/OT Ongoing, Progressing     Description: Goals to be met by: DISCHARGE    Patient will increase functional independence with mobility by performin. Supine to sit with Modified McKenzie - MET 2023  2. Sit to supine with Modified McKenzie - NOT MET  3. Sit to stand transfer with Modified McKenzie - MET 2023  4. Gait  x 300 feet with Modified McKenzie using No Assistive Device - NOT MET           Plan     Patient Discharged to: home or self care per chart.    2023

## 2023-06-23 LAB — T PALLIDUM AB SER QL: NORMAL

## 2023-07-24 ENCOUNTER — OFFICE VISIT (OUTPATIENT)
Dept: INTERNAL MEDICINE | Facility: CLINIC | Age: 63
End: 2023-07-24

## 2023-07-24 ENCOUNTER — TELEPHONE (OUTPATIENT)
Dept: INTERNAL MEDICINE | Facility: CLINIC | Age: 63
End: 2023-07-24

## 2023-07-24 VITALS
RESPIRATION RATE: 18 BRPM | DIASTOLIC BLOOD PRESSURE: 71 MMHG | TEMPERATURE: 99 F | HEART RATE: 79 BPM | OXYGEN SATURATION: 95 % | BODY MASS INDEX: 28.49 KG/M2 | SYSTOLIC BLOOD PRESSURE: 112 MMHG | HEIGHT: 65 IN | WEIGHT: 171 LBS

## 2023-07-24 DIAGNOSIS — Z79.4 TYPE 2 DIABETES MELLITUS WITH OTHER SPECIFIED COMPLICATION, WITH LONG-TERM CURRENT USE OF INSULIN: ICD-10-CM

## 2023-07-24 DIAGNOSIS — H60.501 ACUTE OTITIS EXTERNA OF RIGHT EAR, UNSPECIFIED TYPE: ICD-10-CM

## 2023-07-24 DIAGNOSIS — H60.90 OTITIS EXTERNA, UNSPECIFIED CHRONICITY, UNSPECIFIED LATERALITY, UNSPECIFIED TYPE: Primary | ICD-10-CM

## 2023-07-24 DIAGNOSIS — H61.22 IMPACTED CERUMEN OF LEFT EAR: ICD-10-CM

## 2023-07-24 DIAGNOSIS — Z90.49 S/P CHOLECYSTECTOMY: ICD-10-CM

## 2023-07-24 DIAGNOSIS — E11.65 HYPERGLYCEMIA DUE TO DIABETES MELLITUS: Primary | ICD-10-CM

## 2023-07-24 DIAGNOSIS — E11.69 TYPE 2 DIABETES MELLITUS WITH OTHER SPECIFIED COMPLICATION, WITH LONG-TERM CURRENT USE OF INSULIN: ICD-10-CM

## 2023-07-24 PROCEDURE — 99215 OFFICE O/P EST HI 40 MIN: CPT | Mod: PBBFAC

## 2023-07-24 RX ORDER — AZITHROMYCIN 250 MG/1
TABLET, FILM COATED ORAL
Qty: 6 TABLET | Refills: 0 | Status: SHIPPED | OUTPATIENT
Start: 2023-07-24 | End: 2023-07-29

## 2023-07-24 RX ORDER — CIPROFLOXACIN 0.5 MG/.25ML
4 SOLUTION/ DROPS AURICULAR (OTIC) 2 TIMES DAILY
Qty: 14 EACH | Refills: 1 | Status: SHIPPED | OUTPATIENT
Start: 2023-07-24 | End: 2023-07-25 | Stop reason: RX

## 2023-07-24 NOTE — TELEPHONE ENCOUNTER
[facility] pharmacist called stating some ear drops was escribed to them for pt but they are out of stock and the price for them is $90.22. Pharmacist is requesting Ciprodex instead.    Patient

## 2023-07-24 NOTE — PROGRESS NOTES
"[facility] INTERNAL MEDICINE  OUTPATIENT OFFICE VISIT NOTE    SUBJECTIVE:      HPI: Mary Gordillo is a 62 y.o. female w/ PMHx of type 2 diabetes and hyperlipidemia who comes to  postvoid clinic today.  Patient was admitted to [facility] inpatient services from 06/16/2023-06/20/2023 for acute pancreatitis secondary to cholelithiasis status post cholecystectomy 06/19/2023 without complications.  At discharge from hospital, patient was referred to Infectious Disease for a positive syphilis antibody with negative RPR and Gynecology for vaginal itching and abnormal uterine bleeding, the former of which was mentioned by patient at time of discharge.    Patient continues to endorse pain at the incision sites of her cholecystectomy, stating that sometimes there is purulent discharge.  Patient denies any fevers, night sweats, nausea vomiting, intolerance to food.  Patient is endorsing body aches for the last week but states it could be related to her upper respiratory infection/otitis externa.    ROS:  (+)  (-) Chest pain, palpitations, SOB, dizziness, syncope, edema, PND, orthopnea, claudication, cough, fever, chills, abdominal pain, vomiting, diarrhea, dysuria, bleeding    OBJECTIVE:     Vital signs:   /71 (BP Location: Left arm, Patient Position: Sitting, BP Method: Large (Automatic))   Pulse 79   Temp 98.6 °F (37 °C) (Oral)   Resp 18   Ht 5' 5" (1.651 m)   Wt 77.6 kg (171 lb)   SpO2 95%   BMI 28.46 kg/m²      Physical Examination:  Physical Exam  Vitals and nursing note reviewed.   Constitutional:       General: She is not in acute distress.     Appearance: Normal appearance. She is not ill-appearing or toxic-appearing.   HENT:      Head: Normocephalic.      Right Ear: Tympanic membrane and external ear normal.      Left Ear: Tympanic membrane and external ear normal. There is impacted cerumen.      Nose: Nose normal.      Mouth/Throat:      Mouth: Mucous membranes are dry.      Pharynx: Posterior oropharyngeal " erythema present. No oropharyngeal exudate.   Eyes:      General: No scleral icterus.     Extraocular Movements: Extraocular movements intact.      Pupils: Pupils are equal, round, and reactive to light.   Cardiovascular:      Rate and Rhythm: Normal rate and regular rhythm.      Pulses: Normal pulses.      Heart sounds: Normal heart sounds. No murmur heard.  Pulmonary:      Effort: Pulmonary effort is normal. No respiratory distress.      Breath sounds: Normal breath sounds. No wheezing or rales.   Abdominal:      General: Abdomen is flat. There is no distension.      Palpations: Abdomen is soft. There is mass.      Tenderness: There is abdominal tenderness. There is no guarding.      Comments: 2x2 cm soft collection noted under the midline incision. Midline incision scabbed over without discharge with palpation. No obvious dehiscence of the LT upper incision.    Musculoskeletal:         General: No swelling. Normal range of motion.      Cervical back: Normal range of motion.      Right lower leg: No edema.      Left lower leg: No edema.   Skin:     General: Skin is warm.      Capillary Refill: Capillary refill takes less than 2 seconds.      Coloration: Skin is not jaundiced or pale.      Findings: Rash (Mild erythematous rash the pubic region, surrounding follicles giving evidence the ingrown hairs to be called for.  One or 2 solitary vesicles noted, no clusters noted.) present. No lesion.   Neurological:      General: No focal deficit present.      Mental Status: She is alert and oriented to person, place, and time. Mental status is at baseline.           Current Outpatient Medications:     HYDROcodone-acetaminophen (NORCO) 5-325 mg per tablet, Take 1 tablet by mouth every 8 (eight) hours as needed for Pain (severe breakthrough pain)., Disp: 15 tablet, Rfl: 0    insulin aspart U-100 (NOVOLOG) 100 unit/mL injection, Inject 6 Units into the skin 3 (three) times daily., Disp: 10 mL, Rfl: 11    insulin detemir U-100  (LEVEMIR) 100 unit/mL injection, Inject 30 Units into the skin every evening., Disp: 10 mL, Rfl: 12     ASSESSMENT & PLAN:     Acute pancreatitis secondary to cholelithiasis status post cholecystectomy (06/19/2023)  -complains of continuing pain of incision sites with intermittent purulent discharge of midline incision site  -Physical exam concerning for possible collection deep to the midline superficial umbilicus incision  -urgently referred to General surgery  -Denies any fevers, night sweats, malaise, nausea, vomiting, intolerance to food, poor p.o. intake, diarrhea or constipation    Type 2 diabetes   -A1c 11.6 (06/16/2023)  -POCT glucose 360 in office  -Insulin regimen at time of discharge: NovoLog 30 units nightly and an aspart 6 units t.i.d. with meals   -was on NPH 70/30 20 units b.i.d. prior to admission  -increased NovoLog to 35 units nightly (from 30 units nightly) and add NovoLog 20 units every morning  -continue aspart 6 units t.i.d. with meals   -instructed to chart morning and evening blood sugars and bring to 1 month follow-up  - Reporting 160-180 in the morning, highest of of 250    Vaginal itching  Abnormal uterine bleeding   -referred referral to Gynecology made at discharge pending  -Physical exam revealing more likely itchiness/discomfort secondary to ingrown hairs, no obvious signs of    Upper respiratory infection, suspicion for bacterial  Otitis externa, LT  -endorsing a history of sore throat left-sided ear pain and productive cough with greenish sputum  -Prescribed Z-Mario and ciprofloxacin 0.2% otic solution     Positive syphilis antibody, negative RPR  -referral to Infectious Disease made at time of discharge pending     #: [id number]      Pierce Leon MD  [facility] Internal Medicine, [facility]

## 2023-07-25 RX ORDER — CIPROFLOXACIN AND DEXAMETHASONE 3; 1 MG/ML; MG/ML
4 SUSPENSION/ DROPS AURICULAR (OTIC) 2 TIMES DAILY
Qty: 7.5 ML | Refills: 1 | Status: SHIPPED | OUTPATIENT
Start: 2023-07-25

## 2023-07-25 NOTE — PROGRESS NOTES
I have reviewed and concur with the resident's history, physical, assessment, and plan.  I have discussed with him all issues related to the diagnosis, workup and treatment plan. Care provided as reasonable and necessary.insulin rxment discussed    Darwin Muniz MD  [facility]

## 2023-08-08 ENCOUNTER — OFFICE VISIT (OUTPATIENT)
Dept: SURGERY | Facility: CLINIC | Age: 63
End: 2023-08-08

## 2023-08-08 VITALS
SYSTOLIC BLOOD PRESSURE: 124 MMHG | HEIGHT: 65 IN | DIASTOLIC BLOOD PRESSURE: 74 MMHG | OXYGEN SATURATION: 95 % | RESPIRATION RATE: 18 BRPM | WEIGHT: 171 LBS | HEART RATE: 78 BPM | BODY MASS INDEX: 28.49 KG/M2 | TEMPERATURE: 98 F

## 2023-08-08 DIAGNOSIS — Z90.49 S/P CHOLECYSTECTOMY: ICD-10-CM

## 2023-08-08 PROCEDURE — 99214 OFFICE O/P EST MOD 30 MIN: CPT | Mod: PBBFAC

## 2023-08-08 NOTE — PROGRESS NOTES
[facility] General Surgery Clinic Note    HPI: Mary Gordillo is a 61 y/o female with history of gallstone pancreatitis with acute cholecystitis s/p laparoscopic cholecystectomy 6/19/23.  used throughout appointment. Well healing incisions, initially supraumbilical incision had drained, but has not noticed drainage for a couple weeks. Normal bowel and urinary function. The patient is tolerating PO intake and denies nausea and vomiting. No acute complaints.     Objective:  Vitals:  Vitals:    08/08/23 1117   BP: 124/74   Pulse: 78   Resp: 18   Temp: 98.4 °F (36.9 °C)      Physical Exam:  Gen: NAD  Neuro: awake, alert, answering questions appropriately  CV: RRR  Resp: non-labored breathing, LEAH  Abd: soft, ND, NT, periumbilical incision with 3 mm x 6 mm scab without erythema or drainage, other incisions healed  Ext: moves all 4 spontaneously and purposefully  Skin: warm, well perfused    Pathology:  Gallbladder, cholecystectomy:   - Chronic cholecystitis and cholelithiasis.       Assessment/Plan:  Mary Gordillo is a 61 y/o female with history of gallstone pancreatitis with acute cholecystitis s/p laparoscopic cholecystectomy 6/19/23, she is progressing well.    - Follow up with general surgery PRN  - Routine health maintenance with PCP    Regulo Rodriguez   [employer] Student    RESIDENT ATTESTATION    I have seen and  evaluated the patient with the student doctor. Agree with assessment and plan with following changes:    Assessment/Plan:  Mary Gordillo is a 62 y.o. female with above history here for postop evaluation s/p laparoscopic cholecystectomy on 6/19. Pathology consistent with chronic cholecystitis with cholelithiasis. Doing well, well healed incisions. The supraumbilical incision initially drained for a few weeks but has since scabbed over without any signs of infection. Can return to clinic PRN otherwise follow-up with PCP.    Pierce Hathaway MD  [facility] General Surgery PGY-1  12:09 PM

## 2023-08-08 NOTE — PROGRESS NOTES
I have reviewed the notes, assessments, and/or procedures performed by the resident, I concur with her/his documentation of Mary Gordillo.     Tameka Mueller MD

## 2023-08-16 LAB
INR PPP: 0.9
PROTHROMBIN TIME: 12.1 SECONDS (ref 11.4–14)

## 2024-04-23 ENCOUNTER — OFFICE VISIT (OUTPATIENT)
Dept: URGENT CARE | Facility: CLINIC | Age: 64
End: 2024-04-23

## 2024-04-23 VITALS
BODY MASS INDEX: 26.66 KG/M2 | HEART RATE: 80 BPM | RESPIRATION RATE: 16 BRPM | WEIGHT: 160 LBS | OXYGEN SATURATION: 96 % | HEIGHT: 65 IN | TEMPERATURE: 98 F | DIASTOLIC BLOOD PRESSURE: 80 MMHG | SYSTOLIC BLOOD PRESSURE: 129 MMHG

## 2024-04-23 DIAGNOSIS — K64.4 EXTERNAL HEMORRHOIDS: ICD-10-CM

## 2024-04-23 DIAGNOSIS — R10.9 ABDOMINAL PAIN, UNSPECIFIED ABDOMINAL LOCATION: ICD-10-CM

## 2024-04-23 DIAGNOSIS — N89.8 VAGINAL ITCHING: ICD-10-CM

## 2024-04-23 DIAGNOSIS — E11.65 TYPE 2 DIABETES MELLITUS WITH HYPERGLYCEMIA, WITH LONG-TERM CURRENT USE OF INSULIN: Primary | ICD-10-CM

## 2024-04-23 DIAGNOSIS — Z79.4 TYPE 2 DIABETES MELLITUS WITH HYPERGLYCEMIA, WITH LONG-TERM CURRENT USE OF INSULIN: Primary | ICD-10-CM

## 2024-04-23 LAB
BILIRUB UR QL STRIP: NEGATIVE
CLUE CELLS VAG QL WET PREP: ABNORMAL
GLUCOSE SERPL-MCNC: 217 MG/DL (ref 70–110)
GLUCOSE UR QL STRIP: NEGATIVE
KETONES UR QL STRIP: POSITIVE
LEUKOCYTE ESTERASE UR QL STRIP: NEGATIVE
PH, POC UA: 6
POC BLOOD, URINE: NEGATIVE
POC NITRATES, URINE: NEGATIVE
PROT UR QL STRIP: POSITIVE
SP GR UR STRIP: 1.02 (ref 1–1.03)
T VAGINALIS VAG QL WET PREP: ABNORMAL
UROBILINOGEN UR STRIP-ACNC: 0.2 (ref 0.1–1.1)
WBC #/AREA VAG WET PREP: ABNORMAL
YEAST SPEC QL WET PREP: ABNORMAL

## 2024-04-23 PROCEDURE — 99215 OFFICE O/P EST HI 40 MIN: CPT | Mod: PBBFAC

## 2024-04-23 PROCEDURE — 81003 URINALYSIS AUTO W/O SCOPE: CPT | Mod: PBBFAC

## 2024-04-23 PROCEDURE — 87210 SMEAR WET MOUNT SALINE/INK: CPT

## 2024-04-23 PROCEDURE — 99214 OFFICE O/P EST MOD 30 MIN: CPT | Mod: S$PBB,,,

## 2024-04-23 PROCEDURE — 82962 GLUCOSE BLOOD TEST: CPT | Mod: PBBFAC

## 2024-04-23 RX ORDER — HYDROCORTISONE 25 MG/G
CREAM TOPICAL 2 TIMES DAILY
Qty: 1 EACH | Refills: 0 | Status: SHIPPED | OUTPATIENT
Start: 2024-04-23 | End: 2024-04-30

## 2024-04-23 RX ORDER — FLUCONAZOLE 150 MG/1
150 TABLET ORAL DAILY
Qty: 1 TABLET | Refills: 0 | Status: SHIPPED | OUTPATIENT
Start: 2024-04-23 | End: 2024-04-24

## 2024-04-23 RX ORDER — INSULIN PUMP SYRINGE, 3 ML
EACH MISCELLANEOUS
Qty: 1 EACH | Refills: 0 | Status: SHIPPED | OUTPATIENT
Start: 2024-04-23 | End: 2025-04-23

## 2024-04-23 RX ORDER — DOCUSATE SODIUM 100 MG/1
100 CAPSULE, LIQUID FILLED ORAL 2 TIMES DAILY
Qty: 60 CAPSULE | Refills: 0 | Status: SHIPPED | OUTPATIENT
Start: 2024-04-23 | End: 2024-05-23

## 2024-04-23 NOTE — PROGRESS NOTES
"Subjective:       Patient ID: Mary Gordillo is a 63 y.o. female.    Vitals:  height is 5' 5" (1.651 m) and weight is 72.6 kg (160 lb). Her oral temperature is 98.2 °F (36.8 °C). Her blood pressure is 129/80 and her pulse is 80. Her respiration is 16 and oxygen saturation is 96%.     Chief Complaint: Abdominal Pain ( - [place] [id number] /Abdominal, flank pain and vaginal itching. ), Hemorrhoids, Referral (PCP referral), and Medication Refill (Refill needed in Insulin)    63-year-old  female presents to clinic with son.  Reports recently relocated from [place], [place] next [place].  Reports history of type 2 diabetes, has been taking NovoLog insulin 40 units once daily prescribed in [place].  Does not keep a log of blood sugars.  Complaining of generalized body aches, vaginal itching, rectal pain.    Abdominal Pain  Associated symptoms include constipation, dysuria and myalgias. Pertinent negatives include no fever.   Medication Refill  Associated symptoms include abdominal pain and myalgias. Pertinent negatives include no fatigue or fever.       Constitution: Negative for fatigue, fever and generalized weakness.   Cardiovascular:  Negative for leg swelling and sob on exertion.   Eyes:  Positive for blurred vision.   Respiratory:  Negative for shortness of breath.    Gastrointestinal:  Positive for abdominal pain, constipation, rectal pain and hemorrhoids.   Endocrine: excessive urination.   Genitourinary:  Positive for dysuria, flank pain and vaginal pain. Negative for vaginal discharge and vaginal odor.        Left flank pain    Musculoskeletal:  Positive for muscle ache.   Allergic/Immunologic: Positive for itching.   Neurological:  Negative for dizziness, altered mental status and loss of consciousness.   Psychiatric/Behavioral:  Negative for altered mental status.        Objective:      Physical Exam   Constitutional: She is oriented to person, place, and time. She is " cooperative. She is easily aroused. She does not appear ill. awake  HENT:   Head: Normocephalic and atraumatic.   Eyes: Conjunctivae and lids are normal. Extraocular movement intact   Neck: Neck supple.   Cardiovascular: Normal rate, regular rhythm, S1 normal, S2 normal and normal heart sounds.   Pulses:       Radial pulses are 2+ on the right side and 2+ on the left side.      Comments: Apical 75 BPM    Pulmonary/Chest: Effort normal and breath sounds normal.   Abdominal: Normal appearance and bowel sounds are normal. Soft. flat abdomen There is generalized abdominal tenderness and tenderness in the suprapubic area. There is left CVA tenderness.   Genitourinary: Rectum:      Tenderness and external hemorrhoid present.      No internal hemorrhoid.   Hardeep stage (genital) is 5. No vaginal rash and no pubic lice.    Pelvic exam was performed with patient in the knee-chest position.      Vaginal erythema present.      No vaginal bleeding.   There is erythema in the vagina. No bleeding in the vagina.         Comments: Labia appears mildly inflamed, no discharge or odor appreciated.     Soft stool noted in rectal vault, no bleeding hemorrhoid.      Musculoskeletal:      Right lower leg: No edema.      Left lower leg: No edema.   Lymphadenopathy:     She has no cervical adenopathy.   Neurological: She is alert, oriented to person, place, and time and easily aroused. Gait and coordination normal. GCS eye subscore is 4. GCS verbal subscore is 5. GCS motor subscore is 6.   Skin: Skin is warm, dry and intact. Capillary refill takes less than 2 seconds.   Psychiatric: Her behavior is normal.   Nursing note and vitals reviewed.chaperone present (Jalyn Mosley LPN)           Assessment:       1. Type 2 diabetes mellitus with hyperglycemia, with long-term current use of insulin    2. Abdominal pain, unspecified abdominal location    3. External hemorrhoids    4. Vaginal itching          Plan:     Blood sugar slightly elevated today  in clinic, no signs of UTI for UA. Encouraged patient to  Insulin at [facility], internal medicine referral sent. Continue with Tylenol as needed for body aches, diabetic education discussed, ED precautions reviewed, appears stable for discharge.     Type 2 diabetes mellitus with hyperglycemia, with long-term current use of insulin  -     POCT Glucose, Hand-Held Device  -     Ambulatory referral/consult to Internal Medicine  -     blood-glucose meter kit; Use as instructed  Dispense: 1 each; Refill: 0    Abdominal pain, unspecified abdominal location  -     Wet Prep, Genital  -     POCT Urinalysis, Dipstick, Automated, W/O Scope    External hemorrhoids  -     hydrocortisone (ANUSOL-HC) 2.5 % rectal cream; Place rectally 2 (two) times daily. for 7 days  Dispense: 1 each; Refill: 0  -     docusate sodium (COLACE) 100 MG capsule; Take 1 capsule (100 mg total) by mouth 2 (two) times daily.  Dispense: 60 capsule; Refill: 0    Vaginal itching  -     fluconazole (DIFLUCAN) 150 MG Tab; Take 1 tablet (150 mg total) by mouth once daily. for 1 day  Dispense: 1 tablet; Refill: 0           Results for orders placed or performed in visit on 04/23/24   POCT Urinalysis, Dipstick, Automated, W/O Scope   Result Value Ref Range    POC Blood, Urine Negative Negative    POC Bilirubin, Urine Negative Negative    POC Urobilinogen, Urine 0.2 0.1 - 1.1    POC Ketones, Urine Positive (A) Negative    POC Protein, Urine Positive (A) Negative    POC Nitrates, Urine Negative Negative    POC Glucose, Urine Negative Negative    pH, UA 6.0     POC Specific Gravity, Urine 1.025 1.003 - 1.029    POC Leukocytes, Urine Negative Negative   POCT Glucose, Hand-Held Device   Result Value Ref Range    POC Glucose 217 (A) 70 - 110 MG/DL

## 2024-12-18 NOTE — PT/OT/SLP PROGRESS
Physical Therapy    Missed Treatment Session    Patient Name:  Mary Gordillo   MRN:  [id number]      -patient not seen at this time secondary to off the floor for procedure/surgery  -s/p surgical procedure  -current PT orders discontinued  -PT SERVICES will need new orders to resume/continue therapy   Refill request    Medication: cyclobenzaprine (FLEXERIL) 10 MG tablet     Sig:  TAKE ONE TABLET BY MOUTH THREE TIMES A DAY AS NEEDED FOR MUSCLE SPASMS    Dispensed: 60  Refills: 0    Last prescribed to patient: 11/20/24     Last clinic appointment: 8/19/24  Next clinic appointment: none listed    Preferred pharmacy:    [facility] [address] [place] [address]    Refill request routed to the provider to review.

## (undated) DEVICE — APPLICATOR CHLORAPREP ORN 26ML

## (undated) DEVICE — GLOVE PROTEXIS BLUE LATEX 8

## (undated) DEVICE — GLOVE PROTEXIS BLUE LATEX 7

## (undated) DEVICE — TROCAR ENDOPATH XCEL 11MM 10CM

## (undated) DEVICE — TROCAR KII FIOS 5MM X 100MM

## (undated) DEVICE — KIT SURGICAL TURNOVER

## (undated) DEVICE — GLOVE PROTEXIS HYDROGEL SZ7.5

## (undated) DEVICE — GOWN POLY REINF BRTH SLV XL

## (undated) DEVICE — APPLIER CLIP ENDO MED/LG 10MM

## (undated) DEVICE — GLOVE PROTEXIS LTX MICRO 6

## (undated) DEVICE — SOL NACL IRR 3000ML

## (undated) DEVICE — MANIFOLD 4 PORT

## (undated) DEVICE — GLOVE PROTEXIS LTX MICRO 6.5

## (undated) DEVICE — ADHESIVE DERMABOND ADVANCED

## (undated) DEVICE — SOL IRRI STRL WATER 1000ML

## (undated) DEVICE — SYR 10CC LUER LOCK

## (undated) DEVICE — NDL GRANEE OPEN LOOP GRASPER

## (undated) DEVICE — SUT PDSII 4-0 PS-2 CLEAR MO

## (undated) DEVICE — SUT 0 VICRYL / UR6 (J603)

## (undated) DEVICE — IRRIGATOR SUCTION W/TIP

## (undated) DEVICE — NDL PNEUMO INSUFFLATI 120MM

## (undated) DEVICE — NDL HYPO REG 25G X 1 1/2

## (undated) DEVICE — POSITIONER HEEL FOAM CONVOLTD

## (undated) DEVICE — SLEEVE KII ADV FIX 5X100MM

## (undated) DEVICE — BAG TISS RETRV MONARCH 10MM

## (undated) DEVICE — HANDLE DEVON RIGID OR LIGHT

## (undated) DEVICE — KIT LAPAROSCOPY UNIVERSITY

## (undated) DEVICE — GLOVE PROTEXIS HYDROGEL SZ7